# Patient Record
Sex: FEMALE | Race: WHITE | NOT HISPANIC OR LATINO | Employment: FULL TIME | URBAN - METROPOLITAN AREA
[De-identification: names, ages, dates, MRNs, and addresses within clinical notes are randomized per-mention and may not be internally consistent; named-entity substitution may affect disease eponyms.]

---

## 2019-02-27 ENCOUNTER — OFFICE VISIT (OUTPATIENT)
Dept: PULMONOLOGY | Facility: CLINIC | Age: 29
End: 2019-02-27
Attending: GENETIC COUNSELOR, MS
Payer: COMMERCIAL

## 2019-02-27 DIAGNOSIS — Z31.430 ENCOUNTER OF FEMALE FOR TESTING FOR GENETIC DISEASE CARRIER STATUS FOR PROCREATIVE MANAGEMENT: Primary | ICD-10-CM

## 2019-02-27 DIAGNOSIS — Z82.79 FAMILY HISTORY OF CONGENITAL OR GENETIC CONDITION: ICD-10-CM

## 2019-02-27 PROCEDURE — 36415 COLL VENOUS BLD VENIPUNCTURE: CPT | Performed by: GENETIC COUNSELOR, MS

## 2019-02-27 PROCEDURE — 40000072 ZZH STATISTIC GENETIC COUNSELING, < 16 MIN: Mod: ZF | Performed by: GENETIC COUNSELOR, MS

## 2019-02-27 NOTE — LETTER
2/27/2019    RE: Shanti Manuel  [address] [facility] [address]     Genetic Counseling Consultation    It was a pleasure to meet with Shanti and her , Diego, at her appointment at the [facility] on Feb 27, 2019.  Shanti was referred by her 's urologist, Dr. To, to learn more about carrier testing for cystic fibrosis (CF) since her  has congenital bilateral absence of vas deferens, which is highly associate with mutations in the CF gene (CFTR).  They are interested in carrier testing as this may have an impact on future family planning decisions. Her  is having CF genetic testing today as well.    Personal and Family History:  We began our discussion by taking a family history. Shanti is a healthy 29 year old woman, who reports no known reproductive difficulties. She has no children from previous relationships. Shanti has a 32 year old sister who is healthy and has children of her own, and a 21 year old brother who is healthy.  1. Maternal heritage is remarkable for grandfather who passed away in his 40's from cancer, although it is thought to be due to wartime environmental exposures. Her grandmother passed away at 73 from emphysema.   2. Paternal heritage is remarkable for one female cousin with fertility challenges.   3. There is no additional family history of cystic fibrosis, recurrent respiratory problems, severe asthma or allergies, infertility, birth defects, recurrent miscarriage, developmental delay, other genetic disorders, or consanguinity.  Ancestry is [place] and .    Genetic Counseling for Cystic Fibrosis:  As we discussed, approximately 1 in 25 Caucasians are carriers of CF.  Every person has two copies of every gene, including the gene for cystic fibrosis.  This gene is called CFTR.  When a person has a mutation (gene change) in one of their two copies of the CFTR gene, this person is considered a  carrier of CF. A carrier does not usually know they are a carrier unless they have carrier testing or have a child with CF. Being a carrier should not affect a person s health.  When a person has a mutation in both copies of their CFTR gene (two mutations), they are usually diagnosed with CF.     Cystic fibrosis is inherited in an autosomal recessive pattern, meaning that a child must inherit a mutation in the CFTR gene from both their mother and father in order to have CF. At conception, only one copy of each parent s CFTR gene is passed on to the baby.  No one has control over which copy they pass on to their child since it is a random process.  For couples who are both carriers of CF, there is a 1 in 4 or 25% chance for each child that they have to be affected with CF.  There is also a 3 in 4 or 75% chance for each child to be unaffected (carrier of non-carrier).      There are a number of reproductive options for couples who are at-risk to have a child with CF.  Some couples in utilizing in vitro fertilization to conceive consider pre-implantation genetic testing as part of that process. Others may choose testing during pregnancy, or donor sperm, or adoption. All of these possibilities can be discussed further once testing results are back.      Genetic Testing for Cystic Fibrosis:  Based on a negative family history but  heritage, there is a 1 in 25-30 (3-4%) chance for Shanti to be a carrier of CF.      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 Discussed common mutation panel vs sequencing vs expanded carrier screening for other conditions as well. Because they are going to need to be purposeful about achieving a pregnancy and they are likely to make reproductive decisions based on this information, Shanti did choose full CFTR gene sequencing in addition to  screening for Fragile X and spinal muscular atrophy, which are other relatively common genetic conditions (incidence of 1/[id number] and 1/10,000, respectively). The  Screen testing was ordered through [employer], with risks and benefits discussed and consent form signed. In this process it was also discussed that variants of unknown significance are not disclosed in this testing as it is difficult to make decisions based on this information, so detection rate may vary to reflect this. Shanti expressed understanding.     Plan:   1. Blood drawn today for [employer]'s  Screening test. Insurance verification will occur as part of this process and patient can decide re: insurance vs OOP billing and payment.  2. Shanti will be contacted once results are received in about 3-4 weeks. Revised risks for carrier status and for potential children will be reviewed at this time.   3. Contact information of [phone] was shared for future questions or concerns.       Farheen Noel MS, [facility]  Certified Genetic Counselor  [facility]  [facility]  MD [address] Lagunas  [address] [place]  [address]    Jan To MD  [facility]    Copy to patient   [address] [facility] [address]

## 2019-03-01 LAB — MISCELLANEOUS TEST: NORMAL

## 2019-03-06 NOTE — PROGRESS NOTES
Genetic Counseling Consultation    It was a pleasure to meet with Shanti and her , Diego, at her appointment at the [facility] on Feb 27, 2019.  Shanti was referred by her 's urologist, Dr. To, to learn more about carrier testing for cystic fibrosis (CF) since her  has congenital bilateral absence of vas deferens, which is highly associate with mutations in the CF gene (CFTR).  They are interested in carrier testing as this may have an impact on future family planning decisions. Her  is having CF genetic testing today as well.    Personal and Family History:  We began our discussion by taking a family history. Shanti is a healthy 29 year old woman, who reports no known reproductive difficulties. She has no children from previous relationships. Shanti has a 32 year old sister who is healthy and has children of her own, and a 21 year old brother who is healthy.  1. Maternal heritage is remarkable for grandfather who passed away in his 40's from cancer, although it is thought to be due to wartime environmental exposures. Her grandmother passed away at 73 from emphysema.   2. Paternal heritage is remarkable for one female cousin with fertility challenges.   3. There is no additional family history of cystic fibrosis, recurrent respiratory problems, severe asthma or allergies, infertility, birth defects, recurrent miscarriage, developmental delay, other genetic disorders, or consanguinity.  Ancestry is Malay and .    Genetic Counseling for Cystic Fibrosis:  As we discussed, approximately 1 in 25 Caucasians are carriers of CF.  Every person has two copies of every gene, including the gene for cystic fibrosis.  This gene is called CFTR.  When a person has a mutation (gene change) in one of their two copies of the CFTR gene, this person is considered a carrier of CF. A carrier does not usually know they are a carrier unless they have  carrier testing or have a child with CF. Being a carrier should not affect a person s health.  When a person has a mutation in both copies of their CFTR gene (two mutations), they are usually diagnosed with CF.     Cystic fibrosis is inherited in an autosomal recessive pattern, meaning that a child must inherit a mutation in the CFTR gene from both their mother and father in order to have CF. At conception, only one copy of each parent s CFTR gene is passed on to the baby.  No one has control over which copy they pass on to their child since it is a random process.  For couples who are both carriers of CF, there is a 1 in 4 or 25% chance for each child that they have to be affected with CF.  There is also a 3 in 4 or 75% chance for each child to be unaffected (carrier of non-carrier).      There are a number of reproductive options for couples who are at-risk to have a child with CF.  Some couples in utilizing in vitro fertilization to conceive consider pre-implantation genetic testing as part of that process. Others may choose testing during pregnancy, or donor sperm, or adoption. All of these possibilities can be discussed further once testing results are back.      Genetic Testing for Cystic Fibrosis:  Based on a negative family history but  heritage, there is a 1 in 25-30 (3-4%) chance for Shanti to be a carrier of CF.      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 Discussed common mutation panel vs sequencing vs expanded carrier screening for other conditions as well. Because they are going to need to be purposeful about achieving a pregnancy and they are likely to make reproductive decisions based on this information, Shanti did choose full CFTR gene sequencing in addition to screening for Fragile X and spinal muscular atrophy, which are other relatively common  genetic conditions (incidence of 1/[id number] and 1/10,000, respectively). The  Screen testing was ordered through [email or web address], with risks and benefits discussed and consent form signed. In this process it was also discussed that variants of unknown significance are not disclosed in this testing as it is difficult to make decisions based on this information, so detection rate may vary to reflect this. Shanti expressed understanding.     Plan:   1. Blood drawn today for [employer]'s  Screening test. Insurance verification will occur as part of this process and patient can decide re: insurance vs OOP billing and payment.  2. Shanti will be contacted once results are received in about 3-4 weeks. Revised risks for carrier status and for potential children will be reviewed at this time.   3. Contact information of [phone] was shared for future questions or concerns.     Farheen Noel MS, [facility]  Certified Genetic Counselor  [facility]  [facility]  MD [address] Lagunas  [address] [place]  [address]    Jan To MD  [facility]    Copy to patient   [address] [facility] [address]

## 2019-03-18 ENCOUNTER — TELEPHONE (OUTPATIENT)
Dept: PULMONOLOGY | Facility: CLINIC | Age: 29
End: 2019-03-18

## 2019-03-18 NOTE — LETTER
March 21, 2019      TO: Shanti Manuel  [address] [facility] [address]         Dear Shanti,    It was a pleasure to speak with you recently regarding your [employer]  Screening results, which tested for carrier status of cystic fibrosis, fragile X, and spinal muscular atrophy. As you know, your results were negative, meaning that they did not identify any genetic changes that currently recognized as clinically significant.     A negative test results indicated that your chance of being a carrier of these conditions is greatly reduced. However, you may still have genetic changes not identified by this testing. We discussed your residual risk to be a carrier and they are as follows: cystic fibrosis - 1 in 2700; fragile X - 1 in [address]133; and spinal muscular atrophy - 1 in 567.     You were particularly interested in your risk to be a cystic fibrosis carrier, as your partner was found to have CFTR gene mutations. Based on your results together, we would expect all of your biological children together to be cystic fibrosis carriers, but they would be at very low risk to actually have cystic fibrosis or cystic fibrosis-related disorders.     Thank you for allowing us to participate in your care. If you have any additional questions or concerns please do not hesitate to contact me at [phone].    Sincerely,     Farheen Noel MS, [facility]  Certified Genetic Counselor  [facility]

## 2019-03-18 NOTE — TELEPHONE ENCOUNTER
Called Shanti to discuss her  screening results for CF, Fragile X, and SMA, all of which were negative. This indicates that this test did not identify any genetic changes in these genes that are currently recognized as clinically significant. This test result significantly reduces that chances that Shanti is a carrier of the above listed conditions, but does not rule it out entirely. Reviewed this information with Shanti, and she declined further questions at this time.     Farheen Noel MS, [facility]  Certified Genetic Counselor  [facility]  [facility]

## 2019-07-18 ENCOUNTER — RECORDS - HEALTHEAST (OUTPATIENT)
Dept: ADMINISTRATIVE | Facility: OTHER | Age: 29
End: 2019-07-18

## 2019-07-18 LAB
LAB AP CHARGES (HE HISTORICAL CONVERSION): NORMAL
PATH REPORT.COMMENTS IMP SPEC: NORMAL
PATH REPORT.FINAL DX SPEC: NORMAL
PATH REPORT.GROSS SPEC: NORMAL
PATH REPORT.MICROSCOPIC SPEC OTHER STN: NORMAL
PATH REPORT.RELEVANT HX SPEC: NORMAL
RESULT FLAG (HE HISTORICAL CONVERSION): NORMAL

## 2019-11-11 ENCOUNTER — AMBULATORY - HEALTHEAST (OUTPATIENT)
Dept: MATERNAL FETAL MEDICINE | Facility: HOSPITAL | Age: 29
End: 2019-11-11

## 2019-11-11 ENCOUNTER — OFFICE VISIT - HEALTHEAST (OUTPATIENT)
Dept: MATERNAL FETAL MEDICINE | Facility: HOSPITAL | Age: 29
End: 2019-11-11

## 2019-11-11 ENCOUNTER — RECORDS - HEALTHEAST (OUTPATIENT)
Dept: ULTRASOUND IMAGING | Facility: HOSPITAL | Age: 29
End: 2019-11-11

## 2019-11-11 ENCOUNTER — RECORDS - HEALTHEAST (OUTPATIENT)
Dept: ADMINISTRATIVE | Facility: OTHER | Age: 29
End: 2019-11-11

## 2019-11-11 DIAGNOSIS — O26.90 PREGNANCY RELATED CONDITIONS, UNSPECIFIED, UNSPECIFIED TRIMESTER: ICD-10-CM

## 2019-11-11 DIAGNOSIS — O28.0 ABNORMAL QUAD SCREEN: ICD-10-CM

## 2019-11-11 DIAGNOSIS — O26.90 PREGNANCY, ANTEPARTUM, COMPLICATIONS: ICD-10-CM

## 2019-11-11 DIAGNOSIS — O09.812 PREGNANCY RESULTING FROM IN VITRO FERTILIZATION IN SECOND TRIMESTER: ICD-10-CM

## 2019-12-02 ENCOUNTER — RECORDS - HEALTHEAST (OUTPATIENT)
Dept: ADMINISTRATIVE | Facility: OTHER | Age: 29
End: 2019-12-02

## 2019-12-02 ENCOUNTER — RECORDS - HEALTHEAST (OUTPATIENT)
Dept: ULTRASOUND IMAGING | Facility: HOSPITAL | Age: 29
End: 2019-12-02

## 2019-12-02 ENCOUNTER — OFFICE VISIT - HEALTHEAST (OUTPATIENT)
Dept: MATERNAL FETAL MEDICINE | Facility: HOSPITAL | Age: 29
End: 2019-12-02

## 2019-12-02 DIAGNOSIS — O09.812 SUPERVISION OF PREGNANCY RESULTING FROM ASSISTED REPRODUCTIVE TECHNOLOGY, SECOND TRIMESTER: ICD-10-CM

## 2019-12-02 DIAGNOSIS — O09.812 PREGNANCY RESULTING FROM IN VITRO FERTILIZATION IN SECOND TRIMESTER: ICD-10-CM

## 2019-12-16 ENCOUNTER — RECORDS - HEALTHEAST (OUTPATIENT)
Dept: ULTRASOUND IMAGING | Facility: HOSPITAL | Age: 29
End: 2019-12-16

## 2019-12-16 ENCOUNTER — RECORDS - HEALTHEAST (OUTPATIENT)
Dept: ADMINISTRATIVE | Facility: OTHER | Age: 29
End: 2019-12-16

## 2019-12-16 ENCOUNTER — OFFICE VISIT - HEALTHEAST (OUTPATIENT)
Dept: MATERNAL FETAL MEDICINE | Facility: HOSPITAL | Age: 29
End: 2019-12-16

## 2019-12-16 DIAGNOSIS — O09.812 SUPERVISION OF PREGNANCY RESULTING FROM ASSISTED REPRODUCTIVE TECHNOLOGY, SECOND TRIMESTER: ICD-10-CM

## 2019-12-16 DIAGNOSIS — O09.812 PREGNANCY RESULTING FROM IN VITRO FERTILIZATION IN SECOND TRIMESTER: ICD-10-CM

## 2020-04-02 LAB — LAB SCANNED RESULT: NORMAL

## 2020-04-08 ASSESSMENT — MIFFLIN-ST. JEOR: SCORE: 1698.38

## 2020-04-09 ENCOUNTER — SURGERY - HEALTHEAST (OUTPATIENT)
Dept: OBGYN | Facility: CLINIC | Age: 30
End: 2020-04-09

## 2020-04-09 ENCOUNTER — ANESTHESIA - HEALTHEAST (OUTPATIENT)
Dept: OBGYN | Facility: CLINIC | Age: 30
End: 2020-04-09

## 2020-04-09 ASSESSMENT — MIFFLIN-ST. JEOR: SCORE: 1709.72

## 2020-04-12 ENCOUNTER — HOME CARE/HOSPICE - HEALTHEAST (OUTPATIENT)
Dept: HOME HEALTH SERVICES | Facility: HOME HEALTH | Age: 30
End: 2020-04-12

## 2021-04-25 ENCOUNTER — HEALTH MAINTENANCE LETTER (OUTPATIENT)
Age: 31
End: 2021-04-25

## 2021-06-03 NOTE — PROGRESS NOTES
[facility]  Genetic Counseling Consult    Patient: Shanti Manuel YOB: 1990   Date of Service:  2019      Shanti Manuel was seen at the [facility] for genetic consultation given abnormal quad screen results.      Impression/Plan:   Shanti had a 2/3 complete ultrasound today. Please see ultrasound report for additional information.    Pregnancy History:   /Parity:                            Age at Delivery: 30 y.o.  YEE: 2020, by Other Basis                  Gestational Age: 17w0d  -  No significant complications or exposures were reported in the current pregnancy.  -   This pregnancy was conceived by IVF using self donors. The retrieval date was 19. A single embryo was transferred on 8/3/2019. A li gestation was confirmed by early ultrasound.   - Shanti reported that she had first trimester bleeding between 6 and 13 weeks of pregnancy.    Family History:   A three-generation pedigree was obtained in cystic fibrosis clinic in February in 2019. Shanti's partner has congenital absence of the vas deferens. The patient and her partner had cystic fibrosis carrier screening through the [facility] clinic. She reported that her partner has two CFTR mutations associated with CBAVD and that her CF carrier testing was negative.  A negative CF carrier screening results reduces the risk for Shanti and her partner to have a child with cystic fibrosis.    Otherwise, the reported family history is negative for multiple miscarriages, stillbirths, birth defects, mental retardation, and consanguinity.       Risk Assessment:   We explained that the risk for fetal chromosome abnormalities increases with maternal age. We discussed specific features of common chromosome abnormalities, including Down syndrome, trisomy 13, trisomy 18, and sex chromosome trisomies.      At age  "30 at delivery, the midtrimester risk to have a baby with Down syndrome is 1 in 760.     At age 30 at delivery, the midtrimester risk to have a baby with any chromosome abnormality is 1 in 380.     The patient had a Sequential Screen earlier in pregnancy.     The results were \"Borderline elevation\" for ONTD/VWD    - Chemical values were as follows:    First trimester Analytes:  o ROSEMARY-A:1.16 MoM, free BhC.52, AFP:1.84  o NT:1.3 mm    Second trimester Analytes  o AFP 2.27 MoM, hCG 2.20 MoM, estriol 1.23 MoM, and TIANA 6.48 MoM    This screen gave the following risk assessments:  o Down syndrome risk= 1:1,828  o Trisomy 18 risk= 1:>250,000  o Open neural tube defects  And ventral wall defect combined risk = 1: 144      Testing Options:   We discussed the following options:   Comprehensive (Level II) ultrasound: Detailed ultrasound performed between 18-22 weeks gestation to screen for major birth defects and markers for aneuploidy.      We reviewed the benefits and limitations of this testing.  Screening tests provide a risk assessment specific to the pregnancy for certain fetal chromosome abnormalities, but cannot definitively diagnose or exclude a fetal chromosome abnormality.  Follow-up genetic counseling and consideration of diagnostic testing is recommended with any abnormal screening result. Diagnostic tests carry inherent risks- including risk of miscarriage- that require careful consideration.  These tests can detect fetal chromosome abnormalities with greater than 99% certainty.  Results can be compromised by maternal cell contamination or mosaicism, and are limited by the resolution of cytogenetic G-banding technology.  There is no screening nor diagnostic test that can detect all forms of birth defects or mental disability.    It was a pleasure to be involved with Shanti millard eliu. Face-to-face time of the meeting was 25 minutes.  Sangeetha Campoverde MS, [facility]  Maternal Fetal Medicine  [facility] " [facility]  Phone:[phone]  Phone: [phone]  Email: [email or web address]

## 2021-06-03 NOTE — PROGRESS NOTES
"Please see the \"Imaging\" tab for details of today's ultrasound.    Julee Briceno MD  Specialist in Maternal-Fetal Medicine      "

## 2021-06-04 VITALS — HEIGHT: 66 IN | WEIGHT: 214.5 LBS | BODY MASS INDEX: 34.47 KG/M2

## 2021-06-04 NOTE — PROGRESS NOTES
"Please see \"Imaging\" tab under Chart Review for full report.  This ultrasound was performed in the [facility], and may be located under Care Everywhere.    Sepideh Chilel MD  Maternal Fetal Medicine    "

## 2021-06-07 NOTE — ANESTHESIA PROCEDURE NOTES
Peripheral Block    Patient location during procedure: OR  Start time: 4/9/2020 12:26 PM  End time: 4/9/2020 12:32 PM  post-op analgesia per surgeon order as noted in medical record  Staffing:  Performing  Anesthesiologist: Presley Herndon MD  CRNA: Yarely Kaplan CRNA  Preanesthetic Checklist  Completed: patient identified, site marked, risks, benefits, and alternatives discussed, timeout performed, consent obtained, airway assessed, oxygen available, suction available, emergency drugs available and hand hygiene performed  Peripheral Block  Block type: other, TAP  Prep: ChloraPrep  Patient position: supine  Patient monitoring: cardiac monitor, heart rate, blood pressure and continuous pulse oximetry  Laterality: bilateral, same technique used bilaterally  Injection technique: ultrasound guided    Ultrasound used to visualize needle placement in proximity to nerve being blocked: yes   US used to visualize anesthetic spread  Visualized anatomic structures normal  No Pathological Findings  Permanent ultrasound image captured for medical record  Sterile gel and probe cover used for ultrasound.  Needle  Needle type: echogenic   Needle gauge: 22 G  Needle length: 6 in    Assessment  Injection assessment: no difficulty with injection, negative aspiration for heme, no paresthesia on injection and incremental injection

## 2021-06-07 NOTE — ANESTHESIA CARE TRANSFER NOTE
Last vitals:   Vitals:    04/09/20 1251   BP: 130/76   Pulse: (!) 101   Resp: 16   Temp: 36.5  C (97.7  F)   SpO2: 97%     Patient's level of consciousness is awake  Spontaneous respirations: yes  Maintains airway independently: yes  Dentition unchanged: yes  Oropharynx: oropharynx clear of all foreign objects    QCDR Measures:  ASA# 20 - Surgical Safety Checklist: WHO surgical safety checklist completed prior to induction    PQRS# 430 - Adult PONV Prevention: 4558F - Pt received => 2 anti-emetic agents (different classes) preop & intraop  ASA# 8 - Peds PONV Prevention: NA - Not pediatric patient, not GA or 2 or more risk factors NOT present  PQRS# 424 - Carly-op Temp Management: 4559F - At least one body temp DOCUMENTED => 35.5C or 95.9F within required timeframe  PQRS# 426 - PACU Transfer Protocol: - Transfer of care checklist used  ASA# 14 - Acute Post-op Pain: ASA14B - Patient did NOT experience pain >= 7 out of 10

## 2021-06-07 NOTE — ANESTHESIA PREPROCEDURE EVALUATION
Anesthesia Evaluation      Patient summary reviewed   No history of anesthetic complications     Airway    Pulmonary - negative ROS                          Cardiovascular - negative ROS  (+) hypertension (preE), ,      Neuro/Psych - negative ROS     Endo/Other    (+) obesity, pregnant     GI/Hepatic/Renal    (+) GERD,             Dental                         Anesthesia Plan  Planned anesthetic: epidural  Bilateral TAP blocks requested by surgeon for POP  Duramorph for C/S    ASA 3 - emergent   Induction: intravenous   Anesthetic plan and risks discussed with: patient    Post-op plan: epidural analgesia and routine recovery        1132 -- Urgent C/S called 2/2 fetal distress  EDC functioning well, will plan to use the EDC  ASA PS changed to 3E

## 2021-06-07 NOTE — ANESTHESIA POSTPROCEDURE EVALUATION
Patient: Shanti Manuel  Procedure(s):   SECTION  Anesthesia type: epidural    Patient location: Labor and Delivery  Last vitals:   Vitals Value Taken Time   /84 2020  1:30 PM   Temp 36.5  C (97.7  F) 2020 12:51 PM   Pulse 101 2020 12:51 PM   Resp 18 2020  1:00 PM   SpO2 94 % 2020  1:00 PM     Post vital signs: stable  Level of consciousness: awake and responds to simple questions  Post-anesthesia pain: pain controlled  Post-anesthesia nausea and vomiting: no  Pulmonary: unassisted, return to baseline  Cardiovascular: stable and blood pressure at baseline  Hydration: adequate  Anesthetic events: no    QCDR Measures:  ASA# 11 - Carly-op Cardiac Arrest: ASA11B - Patient did NOT experience unanticipated cardiac arrest  ASA# 12 - Carly-op Mortality Rate: ASA12B - Patient did NOT die  ASA# 13 - PACU Re-Intubation Rate: NA - No ETT / LMA used for case  ASA# 10 - Composite Anes Safety: ASA10A - No serious adverse event    Additional Notes:

## 2021-06-07 NOTE — ANESTHESIA PROCEDURE NOTES
Epidural Block    Patient location during procedure: OB  Time Called: 4/9/2020 10:16 AM  Reason for Block:labor epidural  Staffing:  Performing  Anesthesiologist: Presley Herndon MD  Preanesthetic Checklist  Completed: patient identified, risks, benefits, and alternatives discussed, timeout performed, consent obtained, at patient's request, airway assessed, oxygen available, suction available, emergency drugs available and hand hygiene performed  Procedure  Patient position: sitting  Prep: ChloraPrep  Patient monitoring: continuous pulse oximetry, heart rate and blood pressure  Approach: midline  Location: L3-L4  Injection technique: JERRI air  Number of Attempts:1  Needle  Needle type: Alfredo   Needle gauge: 18 G     Catheter in Space: 4  Assessment  Sensory level:  No complications

## 2021-06-16 PROBLEM — Z3A.38 38 WEEKS GESTATION OF PREGNANCY: Status: ACTIVE | Noted: 2020-04-09

## 2021-06-16 PROBLEM — O14.13 SEVERE PRE-ECLAMPSIA IN THIRD TRIMESTER: Status: ACTIVE | Noted: 2020-04-08

## 2021-07-14 PROBLEM — Z34.90 PREGNANT: Status: RESOLVED | Noted: 2020-04-08 | Resolved: 2020-04-09

## 2021-07-14 PROBLEM — O16.3 HYPERTENSION AFFECTING PREGNANCY IN THIRD TRIMESTER: Status: RESOLVED | Noted: 2020-04-08 | Resolved: 2020-04-09

## 2021-10-10 ENCOUNTER — HEALTH MAINTENANCE LETTER (OUTPATIENT)
Age: 31
End: 2021-10-10

## 2022-04-26 LAB
ABO (EXTERNAL): NORMAL
HEMOGLOBIN (EXTERNAL): 13.7 G/DL (ref 11.1–15.9)
HEPATITIS B SURFACE ANTIGEN (EXTERNAL): NEGATIVE
HIV1+2 AB SERPL QL IA: NONREACTIVE
PLATELET COUNT (EXTERNAL): 398 10E3/UL (ref 150–450)
RH (EXTERNAL): POSITIVE
RUBELLA ANTIBODY IGG (EXTERNAL): NORMAL

## 2022-05-05 ENCOUNTER — E-VISIT (OUTPATIENT)
Dept: URGENT CARE | Facility: CLINIC | Age: 32
End: 2022-05-05
Payer: COMMERCIAL

## 2022-05-05 ENCOUNTER — OFFICE VISIT (OUTPATIENT)
Dept: FAMILY MEDICINE | Facility: CLINIC | Age: 32
End: 2022-05-05
Payer: COMMERCIAL

## 2022-05-05 VITALS
BODY MASS INDEX: 28.41 KG/M2 | DIASTOLIC BLOOD PRESSURE: 90 MMHG | HEART RATE: 87 BPM | TEMPERATURE: 98.6 F | SYSTOLIC BLOOD PRESSURE: 132 MMHG | RESPIRATION RATE: 16 BRPM | OXYGEN SATURATION: 99 % | WEIGHT: 176 LBS

## 2022-05-05 DIAGNOSIS — R05.9 COUGH: Primary | ICD-10-CM

## 2022-05-05 DIAGNOSIS — J06.9 VIRAL URI: Primary | ICD-10-CM

## 2022-05-05 PROCEDURE — 99207 PR NON-BILLABLE SERV PER CHARTING: CPT | Performed by: FAMILY MEDICINE

## 2022-05-05 PROCEDURE — 99203 OFFICE O/P NEW LOW 30 MIN: CPT | Performed by: FAMILY MEDICINE

## 2022-05-05 RX ORDER — PROGESTERONE 50 MG/ML
INJECTION, SOLUTION INTRAMUSCULAR
Status: ON HOLD | COMMUNITY
Start: 2022-04-18 | End: 2022-11-19

## 2022-05-05 NOTE — PATIENT INSTRUCTIONS
Dear Shanti Manuel,    We are sorry you are not feeling well. Based on the responses you provided, it is recommended that you be seen in-person in urgent care so we can better evaluate your symptoms. Please click here to find the nearest urgent care location to you.   You will not be charged for this Visit. Thank you for trusting us with your care.    Komal Aldana MD

## 2022-05-05 NOTE — PROGRESS NOTES
Assessment:       Viral URI    Plan:     Symptoms consistent with a viral upper respiratory infection.  No antibiotics indicated at this time.  Recommend symptomatic care with Mucinex, fluids, rest.  Discussed the typical course of symptoms.  Follow-up if symptoms getting worse or not improving.    MEDICATIONS:   Orders Placed This Encounter   Medications     progesterone, in sesame oil, 50 MG/ML injection     Sig: ADMINISTER 1 ML IN THE MUSCLE EVERY OTHER DAY AS DIRECTED       Subjective:       32 year old female  2 para 1-0-0-1 at a time gestation presents for evaluation of a 7-day history of congestion and productive cough.  She is otherwise been feeling okay without fevers or chills.  No shortness of breath or wheezing.  She denies sore throat or ear pain.  She has not take anything for symptoms.    Patient Active Problem List   Diagnosis     Severe pre-eclampsia in third trimester      delivery delivered     Fetal heart rate decelerations, delivered     38 weeks gestation of pregnancy       No past medical history on file.    Past Surgical History:   Procedure Laterality Date      SECTION N/A 2020    Procedure:  SECTION;  Surgeon: Carol Trejo MD;  Location: [facility] OR;  Service: Obstetrics     POLYPECTOMY  2019    removed from uterus       Current Outpatient Medications   Medication     prenatal no115-iron-folic acid 29 mg iron- 1 mg Chew     progesterone, in sesame oil, 50 MG/ML injection     acetaminophen (TYLENOL) 325 MG tablet     ascorbic acid, vitamin C, (VITAMIN C) 1000 MG tablet     cholecalciferol, vitamin D3, 1,000 unit (25 mcg) tablet     ibuprofen (ADVIL,MOTRIN) 800 MG tablet     oxyCODONE (ROXICODONE) 5 MG immediate release tablet     No current facility-administered medications for this visit.       No Known Allergies    No family history on file.    Social History     Socioeconomic History     Marital status:      Spouse name: None      Number of children: None     Years of education: None     Highest education level: None   Tobacco Use     Smoking status: Never Smoker     Smokeless tobacco: Never Used   Substance and Sexual Activity     Alcohol use: Never     Drug use: Never     Sexual activity: Yes     Partners: Male         Review of Systems  Pertinent items are noted in HPI.      Objective:                     General Appearance:    BP (!) 132/90   Pulse 87   Temp 98.6  F (37  C)   Resp 16   Wt 79.8 kg (176 lb)   LMP 11/10/2021 (Approximate)   SpO2 99%   Breastfeeding No   BMI 28.41 kg/m          Alert, pleasant, cooperative, no distress, appears stated age   Head:    Normocephalic, without obvious abnormality, atraumatic   Eyes:    Conjunctiva/corneas clear   Ears:    Normal TM's without erythema or bulging. Normal external ear canals, both ears   Nose:   Nares normal, septum midline, mucosa normal, no drainage    or sinus tenderness   Throat:   Lips, mucosa, and tongue normal; teeth and gums normal.  No tonsilar hypertrophy or exudate.   Neck:   Supple, symmetrical, trachea midline, no adenopathy    Lungs:     Clear to auscultation bilaterally without wheezes, rales, or rhonchi, respirations unlabored    Heart:    Regular rate and rhythm, S1 and S2 normal, no murmur, rub or gallop       Extremities:   Extremities normal, atraumatic, no cyanosis or edema   Skin:   Skin color, texture, turgor normal, no rashes or lesions         This note has been dictated using voice recognition software. Any grammatical or context distortions are unintentional and inherent to the software

## 2022-05-21 ENCOUNTER — HEALTH MAINTENANCE LETTER (OUTPATIENT)
Age: 32
End: 2022-05-21

## 2022-09-15 LAB — TREPONEMA PALLIDUM ANTIBODY (EXTERNAL): NONREACTIVE

## 2022-09-18 ENCOUNTER — HEALTH MAINTENANCE LETTER (OUTPATIENT)
Age: 32
End: 2022-09-18

## 2022-11-02 LAB — GROUP B STREPTOCOCCUS (EXTERNAL): NEGATIVE

## 2022-11-17 ENCOUNTER — ANESTHESIA EVENT (OUTPATIENT)
Dept: OBGYN | Facility: CLINIC | Age: 32
End: 2022-11-17
Payer: COMMERCIAL

## 2022-11-17 ENCOUNTER — HOSPITAL ENCOUNTER (INPATIENT)
Facility: CLINIC | Age: 32
LOS: 2 days | Discharge: HOME | End: 2022-11-19
Attending: OBSTETRICS & GYNECOLOGY | Admitting: OBSTETRICS & GYNECOLOGY
Payer: COMMERCIAL

## 2022-11-17 ENCOUNTER — ANCILLARY PROCEDURE (OUTPATIENT)
Dept: ULTRASOUND IMAGING | Facility: CLINIC | Age: 32
End: 2022-11-17
Attending: ANESTHESIOLOGY
Payer: COMMERCIAL

## 2022-11-17 ENCOUNTER — ANESTHESIA (OUTPATIENT)
Dept: OBGYN | Facility: CLINIC | Age: 32
End: 2022-11-17
Payer: COMMERCIAL

## 2022-11-17 LAB
ABO/RH(D): NORMAL
ANTIBODY SCREEN: NEGATIVE
BASOPHILS # BLD AUTO: 0 10E3/UL (ref 0–0.2)
BASOPHILS NFR BLD AUTO: 0 %
EOSINOPHIL # BLD AUTO: 0.1 10E3/UL (ref 0–0.7)
EOSINOPHIL NFR BLD AUTO: 1 %
ERYTHROCYTE [DISTWIDTH] IN BLOOD BY AUTOMATED COUNT: 13 % (ref 10–15)
HCT VFR BLD AUTO: 34 % (ref 35–47)
HGB BLD-MCNC: 10.9 G/DL (ref 11.7–15.7)
IMM GRANULOCYTES # BLD: 0.2 10E3/UL
IMM GRANULOCYTES NFR BLD: 2 %
LYMPHOCYTES # BLD AUTO: 0.3 10E3/UL (ref 0.8–5.3)
LYMPHOCYTES NFR BLD AUTO: 3 %
MCH RBC QN AUTO: 26.3 PG (ref 26.5–33)
MCHC RBC AUTO-ENTMCNC: 32.1 G/DL (ref 31.5–36.5)
MCV RBC AUTO: 82 FL (ref 78–100)
MONOCYTES # BLD AUTO: 1.2 10E3/UL (ref 0–1.3)
MONOCYTES NFR BLD AUTO: 11 %
NEUTROPHILS # BLD AUTO: 9.2 10E3/UL (ref 1.6–8.3)
NEUTROPHILS NFR BLD AUTO: 83 %
NRBC # BLD AUTO: 0 10E3/UL
NRBC BLD AUTO-RTO: 0 /100
PLATELET # BLD AUTO: 222 10E3/UL (ref 150–450)
RBC # BLD AUTO: 4.14 10E6/UL (ref 3.8–5.2)
SARS-COV-2 RNA RESP QL NAA+PROBE: POSITIVE
SPECIMEN EXPIRATION DATE: NORMAL
WBC # BLD AUTO: 11 10E3/UL (ref 4–11)

## 2022-11-17 PROCEDURE — 258N000003 HC RX IP 258 OP 636: Performed by: OBSTETRICS & GYNECOLOGY

## 2022-11-17 PROCEDURE — C9290 INJ, BUPIVACAINE LIPOSOME: HCPCS

## 2022-11-17 PROCEDURE — 272N000001 HC OR GENERAL SUPPLY STERILE: Performed by: OBSTETRICS & GYNECOLOGY

## 2022-11-17 PROCEDURE — 999N000249 HC STATISTIC C-SECTION ON UNIT

## 2022-11-17 PROCEDURE — 36415 COLL VENOUS BLD VENIPUNCTURE: CPT | Performed by: OBSTETRICS & GYNECOLOGY

## 2022-11-17 PROCEDURE — 10907ZC DRAINAGE OF AMNIOTIC FLUID, THERAPEUTIC FROM PRODUCTS OF CONCEPTION, VIA NATURAL OR ARTIFICIAL OPENING: ICD-10-PCS | Performed by: OBSTETRICS & GYNECOLOGY

## 2022-11-17 PROCEDURE — 360N000076 HC SURGERY LEVEL 3, PER MIN: Performed by: OBSTETRICS & GYNECOLOGY

## 2022-11-17 PROCEDURE — 250N000011 HC RX IP 250 OP 636: Performed by: ANESTHESIOLOGY

## 2022-11-17 PROCEDURE — 250N000013 HC RX MED GY IP 250 OP 250 PS 637: Performed by: OBSTETRICS & GYNECOLOGY

## 2022-11-17 PROCEDURE — 370N000017 HC ANESTHESIA TECHNICAL FEE, PER MIN: Performed by: OBSTETRICS & GYNECOLOGY

## 2022-11-17 PROCEDURE — 120N000001 HC R&B MED SURG/OB

## 2022-11-17 PROCEDURE — C9290 INJ, BUPIVACAINE LIPOSOME: HCPCS | Performed by: ANESTHESIOLOGY

## 2022-11-17 PROCEDURE — 86901 BLOOD TYPING SEROLOGIC RH(D): CPT | Performed by: OBSTETRICS & GYNECOLOGY

## 2022-11-17 PROCEDURE — 250N000011 HC RX IP 250 OP 636

## 2022-11-17 PROCEDURE — 86850 RBC ANTIBODY SCREEN: CPT | Performed by: OBSTETRICS & GYNECOLOGY

## 2022-11-17 PROCEDURE — 85025 COMPLETE CBC W/AUTO DIFF WBC: CPT | Performed by: OBSTETRICS & GYNECOLOGY

## 2022-11-17 PROCEDURE — 258N000003 HC RX IP 258 OP 636: Performed by: NURSE ANESTHETIST, CERTIFIED REGISTERED

## 2022-11-17 PROCEDURE — 250N000009 HC RX 250: Performed by: NURSE ANESTHETIST, CERTIFIED REGISTERED

## 2022-11-17 PROCEDURE — 250N000011 HC RX IP 250 OP 636: Performed by: OBSTETRICS & GYNECOLOGY

## 2022-11-17 PROCEDURE — U0003 INFECTIOUS AGENT DETECTION BY NUCLEIC ACID (DNA OR RNA); SEVERE ACUTE RESPIRATORY SYNDROME CORONAVIRUS 2 (SARS-COV-2) (CORONAVIRUS DISEASE [COVID-19]), AMPLIFIED PROBE TECHNIQUE, MAKING USE OF HIGH THROUGHPUT TECHNOLOGIES AS DESCRIBED BY CMS-2020-01-R: HCPCS | Performed by: OBSTETRICS & GYNECOLOGY

## 2022-11-17 PROCEDURE — 86780 TREPONEMA PALLIDUM: CPT | Performed by: OBSTETRICS & GYNECOLOGY

## 2022-11-17 PROCEDURE — 999N000249 HC STATISTIC C-SECTION ON UNIT: Performed by: OBSTETRICS & GYNECOLOGY

## 2022-11-17 PROCEDURE — 88307 TISSUE EXAM BY PATHOLOGIST: CPT | Mod: TC | Performed by: OBSTETRICS & GYNECOLOGY

## 2022-11-17 PROCEDURE — 250N000011 HC RX IP 250 OP 636: Performed by: NURSE ANESTHETIST, CERTIFIED REGISTERED

## 2022-11-17 RX ORDER — OXYTOCIN/0.9 % SODIUM CHLORIDE 30/500 ML
100-340 PLASTIC BAG, INJECTION (ML) INTRAVENOUS CONTINUOUS PRN
Status: CANCELLED | OUTPATIENT
Start: 2022-11-17

## 2022-11-17 RX ORDER — OXYCODONE HYDROCHLORIDE 5 MG/1
5 TABLET ORAL EVERY 4 HOURS PRN
Status: DISCONTINUED | OUTPATIENT
Start: 2022-11-17 | End: 2022-11-19 | Stop reason: HOSPADM

## 2022-11-17 RX ORDER — GLYCOPYRROLATE 0.2 MG/ML
INJECTION, SOLUTION INTRAMUSCULAR; INTRAVENOUS PRN
Status: DISCONTINUED | OUTPATIENT
Start: 2022-11-17 | End: 2022-11-17

## 2022-11-17 RX ORDER — BUPIVACAINE HYDROCHLORIDE 2.5 MG/ML
INJECTION, SOLUTION EPIDURAL; INFILTRATION; INTRACAUDAL
Status: COMPLETED | OUTPATIENT
Start: 2022-11-17 | End: 2022-11-17

## 2022-11-17 RX ORDER — CEFAZOLIN SODIUM 2 G/100ML
2 INJECTION, SOLUTION INTRAVENOUS
Status: DISCONTINUED | OUTPATIENT
Start: 2022-11-17 | End: 2022-11-17 | Stop reason: HOSPADM

## 2022-11-17 RX ORDER — ACETAMINOPHEN 325 MG/1
975 TABLET ORAL ONCE
Status: COMPLETED | OUTPATIENT
Start: 2022-11-17 | End: 2022-11-17

## 2022-11-17 RX ORDER — OXYTOCIN/0.9 % SODIUM CHLORIDE 30/500 ML
340 PLASTIC BAG, INJECTION (ML) INTRAVENOUS CONTINUOUS PRN
Status: DISCONTINUED | OUTPATIENT
Start: 2022-11-17 | End: 2022-11-17 | Stop reason: HOSPADM

## 2022-11-17 RX ORDER — MISOPROSTOL 200 UG/1
400 TABLET ORAL
Status: DISCONTINUED | OUTPATIENT
Start: 2022-11-17 | End: 2022-11-19 | Stop reason: HOSPADM

## 2022-11-17 RX ORDER — EPHEDRINE SULFATE 50 MG/ML
INJECTION, SOLUTION INTRAMUSCULAR; INTRAVENOUS; SUBCUTANEOUS PRN
Status: DISCONTINUED | OUTPATIENT
Start: 2022-11-17 | End: 2022-11-17

## 2022-11-17 RX ORDER — ACETAMINOPHEN 325 MG/1
975 TABLET ORAL EVERY 6 HOURS
Status: DISCONTINUED | OUTPATIENT
Start: 2022-11-17 | End: 2022-11-19 | Stop reason: HOSPADM

## 2022-11-17 RX ORDER — AMOXICILLIN 250 MG
1 CAPSULE ORAL 2 TIMES DAILY
Status: DISCONTINUED | OUTPATIENT
Start: 2022-11-17 | End: 2022-11-19 | Stop reason: HOSPADM

## 2022-11-17 RX ORDER — KETOROLAC TROMETHAMINE 30 MG/ML
30 INJECTION, SOLUTION INTRAMUSCULAR; INTRAVENOUS EVERY 6 HOURS
Status: COMPLETED | OUTPATIENT
Start: 2022-11-17 | End: 2022-11-18

## 2022-11-17 RX ORDER — LIDOCAINE 40 MG/G
CREAM TOPICAL
Status: DISCONTINUED | OUTPATIENT
Start: 2022-11-17 | End: 2022-11-17 | Stop reason: HOSPADM

## 2022-11-17 RX ORDER — IBUPROFEN 800 MG/1
800 TABLET, FILM COATED ORAL EVERY 6 HOURS
Status: DISCONTINUED | OUTPATIENT
Start: 2022-11-18 | End: 2022-11-19 | Stop reason: HOSPADM

## 2022-11-17 RX ORDER — MORPHINE SULFATE 1 MG/ML
INJECTION, SOLUTION EPIDURAL; INTRATHECAL; INTRAVENOUS
Status: COMPLETED | OUTPATIENT
Start: 2022-11-17 | End: 2022-11-17

## 2022-11-17 RX ORDER — MODIFIED LANOLIN
OINTMENT (GRAM) TOPICAL
Status: DISCONTINUED | OUTPATIENT
Start: 2022-11-17 | End: 2022-11-19 | Stop reason: HOSPADM

## 2022-11-17 RX ORDER — DIPHENHYDRAMINE HYDROCHLORIDE 50 MG/ML
25 INJECTION INTRAMUSCULAR; INTRAVENOUS EVERY 6 HOURS PRN
Status: DISCONTINUED | OUTPATIENT
Start: 2022-11-17 | End: 2022-11-19 | Stop reason: HOSPADM

## 2022-11-17 RX ORDER — NALOXONE HYDROCHLORIDE 0.4 MG/ML
0.2 INJECTION, SOLUTION INTRAMUSCULAR; INTRAVENOUS; SUBCUTANEOUS
Status: DISCONTINUED | OUTPATIENT
Start: 2022-11-17 | End: 2022-11-19 | Stop reason: HOSPADM

## 2022-11-17 RX ORDER — CEFAZOLIN SODIUM 2 G/100ML
2 INJECTION, SOLUTION INTRAVENOUS SEE ADMIN INSTRUCTIONS
Status: DISCONTINUED | OUTPATIENT
Start: 2022-11-17 | End: 2022-11-17 | Stop reason: HOSPADM

## 2022-11-17 RX ORDER — PROCHLORPERAZINE MALEATE 10 MG
10 TABLET ORAL EVERY 6 HOURS PRN
Status: DISCONTINUED | OUTPATIENT
Start: 2022-11-17 | End: 2022-11-19 | Stop reason: HOSPADM

## 2022-11-17 RX ORDER — DEXTROSE, SODIUM CHLORIDE, SODIUM LACTATE, POTASSIUM CHLORIDE, AND CALCIUM CHLORIDE 5; .6; .31; .03; .02 G/100ML; G/100ML; G/100ML; G/100ML; G/100ML
INJECTION, SOLUTION INTRAVENOUS CONTINUOUS
Status: DISCONTINUED | OUTPATIENT
Start: 2022-11-17 | End: 2022-11-19 | Stop reason: HOSPADM

## 2022-11-17 RX ORDER — MORPHINE SULFATE 0.5 MG/ML
150 INJECTION, SOLUTION EPIDURAL; INTRATHECAL; INTRAVENOUS ONCE
Status: DISCONTINUED | OUTPATIENT
Start: 2022-11-17 | End: 2022-11-17 | Stop reason: HOSPADM

## 2022-11-17 RX ORDER — SODIUM CHLORIDE, SODIUM LACTATE, POTASSIUM CHLORIDE, CALCIUM CHLORIDE 600; 310; 30; 20 MG/100ML; MG/100ML; MG/100ML; MG/100ML
INJECTION, SOLUTION INTRAVENOUS CONTINUOUS
Status: DISCONTINUED | OUTPATIENT
Start: 2022-11-17 | End: 2022-11-17 | Stop reason: HOSPADM

## 2022-11-17 RX ORDER — BUPIVACAINE HYDROCHLORIDE 7.5 MG/ML
INJECTION, SOLUTION INTRASPINAL
Status: COMPLETED | OUTPATIENT
Start: 2022-11-17 | End: 2022-11-17

## 2022-11-17 RX ORDER — METHYLERGONOVINE MALEATE 0.2 MG/ML
200 INJECTION INTRAVENOUS
Status: DISCONTINUED | OUTPATIENT
Start: 2022-11-17 | End: 2022-11-17 | Stop reason: HOSPADM

## 2022-11-17 RX ORDER — MISOPROSTOL 200 UG/1
800 TABLET ORAL
Status: DISCONTINUED | OUTPATIENT
Start: 2022-11-17 | End: 2022-11-17

## 2022-11-17 RX ORDER — OXYTOCIN 10 [USP'U]/ML
10 INJECTION, SOLUTION INTRAMUSCULAR; INTRAVENOUS
Status: CANCELLED | OUTPATIENT
Start: 2022-11-17

## 2022-11-17 RX ORDER — SIMETHICONE 80 MG
80 TABLET,CHEWABLE ORAL 4 TIMES DAILY PRN
Status: DISCONTINUED | OUTPATIENT
Start: 2022-11-17 | End: 2022-11-19 | Stop reason: HOSPADM

## 2022-11-17 RX ORDER — OXYTOCIN/0.9 % SODIUM CHLORIDE 30/500 ML
PLASTIC BAG, INJECTION (ML) INTRAVENOUS CONTINUOUS PRN
Status: DISCONTINUED | OUTPATIENT
Start: 2022-11-17 | End: 2022-11-17

## 2022-11-17 RX ORDER — CARBOPROST TROMETHAMINE 250 UG/ML
250 INJECTION, SOLUTION INTRAMUSCULAR
Status: DISCONTINUED | OUTPATIENT
Start: 2022-11-17 | End: 2022-11-19 | Stop reason: HOSPADM

## 2022-11-17 RX ORDER — BISACODYL 10 MG
10 SUPPOSITORY, RECTAL RECTAL DAILY PRN
Status: DISCONTINUED | OUTPATIENT
Start: 2022-11-19 | End: 2022-11-19 | Stop reason: HOSPADM

## 2022-11-17 RX ORDER — ONDANSETRON 4 MG/1
4 TABLET, ORALLY DISINTEGRATING ORAL EVERY 6 HOURS PRN
Status: DISCONTINUED | OUTPATIENT
Start: 2022-11-17 | End: 2022-11-19 | Stop reason: HOSPADM

## 2022-11-17 RX ORDER — OXYTOCIN 10 [USP'U]/ML
10 INJECTION, SOLUTION INTRAMUSCULAR; INTRAVENOUS
Status: DISCONTINUED | OUTPATIENT
Start: 2022-11-17 | End: 2022-11-19 | Stop reason: HOSPADM

## 2022-11-17 RX ORDER — HYDROCORTISONE 25 MG/G
CREAM TOPICAL 3 TIMES DAILY PRN
Status: DISCONTINUED | OUTPATIENT
Start: 2022-11-17 | End: 2022-11-19 | Stop reason: HOSPADM

## 2022-11-17 RX ORDER — AZITHROMYCIN 500 MG/5ML
500 INJECTION, POWDER, LYOPHILIZED, FOR SOLUTION INTRAVENOUS
Status: COMPLETED | OUTPATIENT
Start: 2022-11-17 | End: 2022-11-17

## 2022-11-17 RX ORDER — CITRIC ACID/SODIUM CITRATE 334-500MG
30 SOLUTION, ORAL ORAL
Status: COMPLETED | OUTPATIENT
Start: 2022-11-17 | End: 2022-11-17

## 2022-11-17 RX ORDER — METOCLOPRAMIDE HYDROCHLORIDE 5 MG/ML
10 INJECTION INTRAMUSCULAR; INTRAVENOUS EVERY 6 HOURS PRN
Status: DISCONTINUED | OUTPATIENT
Start: 2022-11-17 | End: 2022-11-19 | Stop reason: HOSPADM

## 2022-11-17 RX ORDER — NALOXONE HYDROCHLORIDE 0.4 MG/ML
0.4 INJECTION, SOLUTION INTRAMUSCULAR; INTRAVENOUS; SUBCUTANEOUS
Status: DISCONTINUED | OUTPATIENT
Start: 2022-11-17 | End: 2022-11-19 | Stop reason: HOSPADM

## 2022-11-17 RX ORDER — METOCLOPRAMIDE 10 MG/1
10 TABLET ORAL EVERY 6 HOURS PRN
Status: DISCONTINUED | OUTPATIENT
Start: 2022-11-17 | End: 2022-11-19 | Stop reason: HOSPADM

## 2022-11-17 RX ORDER — MISOPROSTOL 200 UG/1
400 TABLET ORAL
Status: DISCONTINUED | OUTPATIENT
Start: 2022-11-17 | End: 2022-11-17

## 2022-11-17 RX ORDER — OXYTOCIN 10 [USP'U]/ML
10 INJECTION, SOLUTION INTRAMUSCULAR; INTRAVENOUS
Status: DISCONTINUED | OUTPATIENT
Start: 2022-11-17 | End: 2022-11-17 | Stop reason: HOSPADM

## 2022-11-17 RX ORDER — OXYTOCIN/0.9 % SODIUM CHLORIDE 30/500 ML
340 PLASTIC BAG, INJECTION (ML) INTRAVENOUS CONTINUOUS PRN
Status: DISCONTINUED | OUTPATIENT
Start: 2022-11-17 | End: 2022-11-19 | Stop reason: HOSPADM

## 2022-11-17 RX ORDER — LIDOCAINE 40 MG/G
CREAM TOPICAL
Status: DISCONTINUED | OUTPATIENT
Start: 2022-11-17 | End: 2022-11-19 | Stop reason: HOSPADM

## 2022-11-17 RX ORDER — CITRIC ACID/SODIUM CITRATE 334-500MG
15 SOLUTION, ORAL ORAL
Status: DISCONTINUED | OUTPATIENT
Start: 2022-11-17 | End: 2022-11-17 | Stop reason: HOSPADM

## 2022-11-17 RX ORDER — METHYLERGONOVINE MALEATE 0.2 MG/ML
200 INJECTION INTRAVENOUS
Status: DISCONTINUED | OUTPATIENT
Start: 2022-11-17 | End: 2022-11-19 | Stop reason: HOSPADM

## 2022-11-17 RX ORDER — MISOPROSTOL 200 UG/1
800 TABLET ORAL
Status: DISCONTINUED | OUTPATIENT
Start: 2022-11-17 | End: 2022-11-19 | Stop reason: HOSPADM

## 2022-11-17 RX ORDER — ONDANSETRON 2 MG/ML
4 INJECTION INTRAMUSCULAR; INTRAVENOUS EVERY 6 HOURS PRN
Status: DISCONTINUED | OUTPATIENT
Start: 2022-11-17 | End: 2022-11-19 | Stop reason: HOSPADM

## 2022-11-17 RX ORDER — AMOXICILLIN 250 MG
2 CAPSULE ORAL 2 TIMES DAILY
Status: DISCONTINUED | OUTPATIENT
Start: 2022-11-17 | End: 2022-11-19 | Stop reason: HOSPADM

## 2022-11-17 RX ORDER — DIPHENHYDRAMINE HCL 25 MG
25 CAPSULE ORAL EVERY 6 HOURS PRN
Status: DISCONTINUED | OUTPATIENT
Start: 2022-11-17 | End: 2022-11-19 | Stop reason: HOSPADM

## 2022-11-17 RX ORDER — PROCHLORPERAZINE 25 MG
25 SUPPOSITORY, RECTAL RECTAL EVERY 12 HOURS PRN
Status: DISCONTINUED | OUTPATIENT
Start: 2022-11-17 | End: 2022-11-19 | Stop reason: HOSPADM

## 2022-11-17 RX ORDER — CARBOPROST TROMETHAMINE 250 UG/ML
250 INJECTION, SOLUTION INTRAMUSCULAR
Status: DISCONTINUED | OUTPATIENT
Start: 2022-11-17 | End: 2022-11-17 | Stop reason: HOSPADM

## 2022-11-17 RX ADMIN — Medication 5 MG: at 14:18

## 2022-11-17 RX ADMIN — Medication 10 MG: at 14:08

## 2022-11-17 RX ADMIN — KETOROLAC TROMETHAMINE 30 MG: 30 INJECTION, SOLUTION INTRAMUSCULAR; INTRAVENOUS at 20:20

## 2022-11-17 RX ADMIN — PHENYLEPHRINE HYDROCHLORIDE 200 MCG: 10 INJECTION INTRAVENOUS at 13:59

## 2022-11-17 RX ADMIN — ACETAMINOPHEN 975 MG: 325 TABLET, FILM COATED ORAL at 21:07

## 2022-11-17 RX ADMIN — ACETAMINOPHEN 975 MG: 325 TABLET ORAL at 13:06

## 2022-11-17 RX ADMIN — PHENYLEPHRINE HYDROCHLORIDE 0.5 MCG/KG/MIN: 10 INJECTION INTRAVENOUS at 13:54

## 2022-11-17 RX ADMIN — SODIUM CITRATE AND CITRIC ACID MONOHYDRATE 30 ML: 500; 334 SOLUTION ORAL at 13:05

## 2022-11-17 RX ADMIN — SODIUM CHLORIDE, POTASSIUM CHLORIDE, SODIUM LACTATE AND CALCIUM CHLORIDE 1000 ML: 600; 310; 30; 20 INJECTION, SOLUTION INTRAVENOUS at 12:32

## 2022-11-17 RX ADMIN — ONDANSETRON 4 MG: 2 INJECTION INTRAMUSCULAR; INTRAVENOUS at 18:03

## 2022-11-17 RX ADMIN — METOCLOPRAMIDE HYDROCHLORIDE 10 MG: 5 INJECTION INTRAMUSCULAR; INTRAVENOUS at 19:05

## 2022-11-17 RX ADMIN — PHENYLEPHRINE HYDROCHLORIDE 200 MCG: 10 INJECTION INTRAVENOUS at 13:55

## 2022-11-17 RX ADMIN — PHENYLEPHRINE HYDROCHLORIDE 200 MCG: 10 INJECTION INTRAVENOUS at 14:03

## 2022-11-17 RX ADMIN — Medication 300 ML/HR: at 14:26

## 2022-11-17 RX ADMIN — Medication 10 MG: at 14:11

## 2022-11-17 RX ADMIN — ONDANSETRON 4 MG: 2 INJECTION INTRAMUSCULAR; INTRAVENOUS at 12:34

## 2022-11-17 RX ADMIN — Medication 0.15 MG: at 13:53

## 2022-11-17 RX ADMIN — BUPIVACAINE HYDROCHLORIDE IN DEXTROSE 1.6 ML: 7.5 INJECTION, SOLUTION SUBARACHNOID at 13:53

## 2022-11-17 RX ADMIN — SODIUM CHLORIDE, POTASSIUM CHLORIDE, SODIUM LACTATE AND CALCIUM CHLORIDE: 600; 310; 30; 20 INJECTION, SOLUTION INTRAVENOUS at 13:08

## 2022-11-17 RX ADMIN — BUPIVACAINE HYDROCHLORIDE 20 ML: 2.5 INJECTION, SOLUTION EPIDURAL; INFILTRATION; INTRACAUDAL at 15:05

## 2022-11-17 RX ADMIN — BUPIVACAINE 20 ML: 13.3 INJECTION, SUSPENSION, LIPOSOMAL INFILTRATION at 15:05

## 2022-11-17 RX ADMIN — GLYCOPYRROLATE 0.2 MG: 0.2 INJECTION, SOLUTION INTRAMUSCULAR; INTRAVENOUS at 14:00

## 2022-11-17 RX ADMIN — AZITHROMYCIN MONOHYDRATE 500 MG: 500 INJECTION, POWDER, LYOPHILIZED, FOR SOLUTION INTRAVENOUS at 13:52

## 2022-11-17 ASSESSMENT — ACTIVITIES OF DAILY LIVING (ADL)
ADLS_ACUITY_SCORE: 35
ADLS_ACUITY_SCORE: 18
DRESSING/BATHING_DIFFICULTY: NO
TOILETING_ISSUES: NO
DIFFICULTY_EATING/SWALLOWING: NO
WEAR_GLASSES_OR_BLIND: NO
CHANGE_IN_FUNCTIONAL_STATUS_SINCE_ONSET_OF_CURRENT_ILLNESS/INJURY: NO
WALKING_OR_CLIMBING_STAIRS_DIFFICULTY: NO
ADLS_ACUITY_SCORE: 35
FALL_HISTORY_WITHIN_LAST_SIX_MONTHS: NO
ADLS_ACUITY_SCORE: 18
CONCENTRATING,_REMEMBERING_OR_MAKING_DECISIONS_DIFFICULTY: NO
ADLS_ACUITY_SCORE: 18
ADLS_ACUITY_SCORE: 18
DOING_ERRANDS_INDEPENDENTLY_DIFFICULTY: NO

## 2022-11-17 NOTE — ANESTHESIA POSTPROCEDURE EVALUATION
Patient: Shanti Manuel    Procedure: Procedure(s):  REPEAT  SECTION       Anesthesia Type:  Spinal    Note:  Disposition: Inpatient   Postop Pain Control: Uneventful            Sign Out: Well controlled pain   PONV: No   Neuro/Psych: Uneventful            Sign Out: Acceptable/Baseline neuro status   Airway/Respiratory: Uneventful            Sign Out: Acceptable/Baseline resp. status   CV/Hemodynamics: Uneventful            Sign Out: Acceptable CV status; No obvious hypovolemia; No obvious fluid overload   Other NRE: NONE   DID A NON-ROUTINE EVENT OCCUR? No    Event details/Postop Comments:  Tolerated spinal anesthetic well. No apparent complications. Please call with any questions.            Last vitals:  Vitals:    22 1157 22 1527 22 1530   BP: 121/73 106/52 109/52   Pulse:  120 116   Resp:  20 18   Temp: 37  C (98.6  F) 36.9  C (98.5  F)    SpO2:  91%        Electronically Signed By: Shai Mazariegos MD  2022  3:43 PM

## 2022-11-17 NOTE — ANESTHESIA PROCEDURE NOTES
TAP Procedure Note    Pre-Procedure   Staff -        Anesthesiologist:  Shai Mazariegos MD       Performed By: anesthesiologist       Location: OR       Procedure Start/Stop Times: 11/17/2022 3:05 PM and 11/17/2022 3:10 PM       Pre-Anesthestic Checklist: patient identified, IV checked, site marked, risks and benefits discussed, informed consent, monitors and equipment checked, pre-op evaluation, at physician/surgeon's request and post-op pain management  Timeout:       Correct Patient: Yes        Correct Procedure: Yes        Correct Site: Yes        Correct Position: Yes        Correct Laterality: Yes        Site Marked: Yes  Procedure Documentation  Procedure: TAP       Diagnosis: POSTOP PAIN CONTROL PER SURGEON REQUEST       Laterality: bilateral       Patient Position: supine       Patient Prep/Sterile Barriers: sterile gloves, mask       Skin prep: Chloraprep       Needle Type: short bevel       Needle Gauge: 21.        Needle Length (Inches): 4        Ultrasound guided       1. Ultrasound was used to identify targeted nerve, plexus, vascular marker, or fascial plane and place a needle adjacent to it in real-time.       2. Ultrasound was used to visualize the spread of anesthetic in close proximity to the above referenced structure.       3. A permanent image is entered into the patient's record.       4. The visualized anatomic structures appeared normal.       5. There were no apparent abnormal pathologic findings.    Assessment/Narrative         The placement was negative for: blood aspirated, painful injection and site bleeding       Paresthesias: No.       Bolus given via needle..        Secured via.        Insertion/Infusion Method: Single Shot       Complications: none       Injection made incrementally with aspirations every 5 mL.    Medication(s) Administered   Bupivacaine 0.25% PF (Infiltration) - Infiltration   20 mL - 11/17/2022 3:05:00 PM  Bupivacaine liposome (Exparel) 1.3% LA inj susp  "(Infiltration) - Infiltration   20 mL - 11/17/2022 3:05:00 PM  Medication Administration Time: 11/17/2022 3:05 PM     Comments:  Bilateral TAP blocks for postop pain control per surgeon request. Divided doses. Each side with 10ml 0.25% bupi and 10ml exparel 1.33%      FOR [facility] ONLY:   Pain Team Contact information: please page the Pain Team Via [employer]. Search \"Pain\". During daytime hours, please page the attending first. At night please page the resident first.    "

## 2022-11-17 NOTE — ANESTHESIA PREPROCEDURE EVALUATION
Anesthesia Pre-Procedure Evaluation    Patient: Shanti Manuel   MRN: [id number] : 1990        Procedure : Procedure(s):  REPEAT  SECTION          History reviewed. No pertinent past medical history.   Past Surgical History:   Procedure Laterality Date      SECTION N/A 2020    Procedure:  SECTION;  Surgeon: Carol Trejo MD;  Location: [facility]D OR;  Service: Obstetrics     POLYPECTOMY  2019    removed from uterus      No Known Allergies   Social History     Tobacco Use     Smoking status: Never     Smokeless tobacco: Never   Substance Use Topics     Alcohol use: Never      Wt Readings from Last 1 Encounters:   22 95.3 kg (210 lb)        Anesthesia Evaluation   Pt has had prior anesthetic. Type: Regional and OB Labor Epidural.    History of anesthetic complications  - PONV.      ROS/MED HX  ENT/Pulmonary:  - neg pulmonary ROS     Neurologic:  - neg neurologic ROS     Cardiovascular:  - neg cardiovascular ROS     METS/Exercise Tolerance:     Hematologic:  - neg hematologic  ROS     Musculoskeletal:       GI/Hepatic:  - neg GI/hepatic ROS     Renal/Genitourinary:       Endo:  - neg endo ROS     Psychiatric/Substance Use:  - neg psychiatric ROS     Infectious Disease:       Malignancy:       Other:      (+) , previous ,         Physical Exam    Airway             Respiratory Devices and Support         Dental  no notable dental history         Cardiovascular   cardiovascular exam normal          Pulmonary   pulmonary exam normal            Other findings: Physiologic changes of pregnancy    OUTSIDE LABS:  CBC:   Lab Results   Component Value Date    WBC 11.0 2022    WBC 16.7 (H) 04/10/2020    HGB 10.9 (L) 2022    HGB 11.7 (L) 04/10/2020    HCT 34.0 (L) 2022    HCT 34.8 (L) 04/10/2020     2022     04/10/2020     BMP:   Lab Results   Component Value Date    CR 0.70 2020    CR 0.69 2020     COAGS:   Lab  Results   Component Value Date    PTT 28 2020    INR 0.87 (L) 2020     POC: No results found for: BGM, HCG, HCGS  HEPATIC:   Lab Results   Component Value Date    ALT <9 2020    AST 17 2020     OTHER:   Lab Results   Component Value Date    MAG 1.7 (L) 2020       Anesthesia Plan    ASA Status:  2, emergent       Anesthesia Type: Spinal.              Consents    Anesthesia Plan(s) and associated risks, benefits, and realistic alternatives discussed. Questions answered and patient/representative(s) expressed understanding.    - Discussed:     - Discussed with:  Patient         Postoperative Care            Comments:    Other Comments: Prior , now with nonreassuring FHT.    SAB with duramorph, tap blocks for postop pain control.   Recent Results (from the past 120 hour(s))  -CBC with platelets and differential:   Collection Time: 22 12:21 PM       Result                                            Value                         Ref Range                       WBC Count                                         11.0                          4.0 - 11.0 10e3/uL              RBC Count                                         4.14                          3.80 - 5.20 10e6/uL             Hemoglobin                                        10.9 (L)                      11.7 - 15.7 g/dL                Hematocrit                                        34.0 (L)                      35.0 - 47.0 %                   MCV                                               82                            78 - 100 fL                     MCH                                               26.3 (L)                      26.5 - 33.0 pg                  MCHC                                              32.1                          31.5 - 36.5 g/dL                RDW                                               13.0                          10.0 - 15.0 %                   Platelet Count                                     222                           150 - 450 10e3/uL               % Neutrophils                                     83                            %                               % Lymphocytes                                     3                             %                               % Monocytes                                       11                            %                               % Eosinophils                                     1                             %                               % Basophils                                       0                             %                               % Immature Granulocytes                           2                             %                               NRBCs per 100 WBC                                 0                             <1 /100                         Absolute Neutrophils                              9.2 (H)                       1.6 - 8.3 10e3/uL               Absolute Lymphocytes                              0.3 (L)                       0.8 - 5.3 10e3/uL               Absolute Monocytes                                1.2                           0.0 - 1.3 10e3/uL               Absolute Eosinophils                              0.1                           0.0 - 0.7 10e3/uL               Absolute Basophils                                0.0                           0.0 - 0.2 10e3/uL               Absolute Immature Granulocytes                    0.2                           <=0.4 10e3/uL                   Absolute NRBCs                                    0.0                           10e3/uL                           neg OB ROS.       Shai Mazariegos MD

## 2022-11-17 NOTE — ANESTHESIA CARE TRANSFER NOTE
Patient: Shanti Manuel    Procedure: Procedure(s):  REPEAT  SECTION       Diagnosis: Previous  delivery, antepartum [O34.219]  Diagnosis Additional Information: No value filed.    Anesthesia Type:   Spinal     Note:    Oropharynx: oropharynx clear of all foreign objects  Level of Consciousness: awake  Oxygen Supplementation: room air    Independent Airway: airway patency satisfactory and stable  Dentition: dentition unchanged  Vital Signs Stable: post-procedure vital signs reviewed and stable  Report to RN Given: handoff report given  Patient transferred to: Labor and Delivery    Handoff Report: Identifed the Patient, Identified the Reponsible Provider, Reviewed the pertinent medical history, Discussed the surgical course, Reviewed Intra-OP anesthesia mangement and issues during anesthesia, Set expectations for post-procedure period and Allowed opportunity for questions and acknowledgement of understanding      Vitals:  Vitals Value Taken Time   /52 22 1527   Temp 36.9  C (98.5  F) 22 1527   Pulse 120 22 1527   Resp 20 22 1527   SpO2 91 % 22 1527       Electronically Signed By: MICHELLE QUARLES CRNA  2022  3:28 PM

## 2022-11-17 NOTE — OP NOTE
SECTION OPERATIVE REPORT    Name:  Shanti Manuel  Location: [facility] OR  Procedure Date:  2022    Pre-Procedure Diagnosis:  1) 31 yo  at 37w3d with contractions  2) Fetal tachycardia  3) Breech presentation  4) Prior  section  5) Hx of preeclampsia in prior pregnancy  6) IVF pregnancy  7) Succenturiate placental lobe  8) GBS negative  9) COVID positive    Post-Procedure Diagnosis:    Same, plus  1) LGA infant (97%ile)  2) Keloid scar    Procedure:  1) Repeat low transverse   2) Revision of prior scar    Surgeon: Sangeetha Mills MD   Assistants: Norman Arriaga and Claudette Siddiqui CSTs    Anesthesia Type:  Regional with TAPS    Findings:  1) Viable female infant in breech presentation. Amniotic fluid clear and copious (~1200 ml). APGARS 7/9. Weight 9#4 (97%ile).  2) Placenta intact, 3vc. Succenturiate lobe noted. Sent to pathology.  3) Normal uterus, tubes, and ovaries  4) Mild fascial scarring    Complications:  None    Specimens:    Placenta       Estimated Blood Loss:   558 ml     Procedure:     Risks, benefits and alternatives were reviewed, including the risks of bleeding, infection and injury to pelvic structures including bladder, bowel and ureters.  After both verbal and written consent were obtained, the patient was taken to [facility] L& Operating Room where anesthesia was administered without difficulty.  Ann catheter was placed and preoperative antibiotics were administered.  The patient was prepped and draped in the usual sterile fashion in the dorsal supine position with a leftward tilt.    After testing for appropriate anesthesia, a Pfannenstiel skin incision was made with scalpel at her previous incision site and carried down to the underlying layer of fascia with both sharp and Bovie cautery.  The fascia was nicked in the midline and this incision was extended laterally with Bowens scissors.  The fascia was dissected off the underlying rectus muscles with  both sharp and blunt dissection.  The rectus muscle was  bluntly in the midline and the peritoneum was entered and  bluntly as well.  Bladder blade was inserted and the vesicouterine peritoneum was identified, and left intact.     The bladder blade was reinserted and the lower uterine segment was incised in a transverse fashion with scalpel and the incision was extended bluntly.  Amniotomy was performed with copious amounts of clear fluid noted.  The  breech was brought to the incision site and was delivered atraumatically using standard breech maneuvers. The remainder of the  was delivered, cord clamped x 2 and cut after 1 minute.   was handed off to waiting delivery team.    The placenta was delivered manually and the uterus was exteriorized and cleared of all clot and debris.  The lower uterine segment was repaired in a running locked fashion with 0-Monocryl.  A second layer was performed in an imbricating manner using 0-Monocryl.  The uterus was returned to the abdomen.  The hysterotomy site was examined and found to be hemostatic. Surgicel powder was placed over the incision  The peritoneum was not closed.    The rectus muscles were bluntly reapproximated in the midline. Additional powder was placed over the rectus muscles. The fascia was closed with 0-vicryl in a running fashion.  The subcutaneous tissues were examined and any bleeding was gently coagulated with Bovie cautery.  The subcuteaneous tissue was reapproximated using 3-0 Plain.      The left aspect of the skin incision had a keloid scar from the prior delivery. The keloid was grasped with Allis clamps and the scar was removed with the scalpel. The subcutaneous tissue was cauterized with the Bovie.The skin was closed with 3-0 Monocryl in a running fashion.  Steri-strips and bandages were applied.     The patient tolerated the procedure well.  All sponge, lap, and needle counts were correct x 2 by OR staff.  The  patient was transferred to her Recovery Room in stable condition.    Specimens: Placenta to pathology    Date: 11/17/2022  Time: 3:28 PM

## 2022-11-17 NOTE — PLAN OF CARE
Problem: Plan of Care - These are the overarching goals to be used throughout the patient stay.    Goal: Plan of Care Review  Description: The Plan of Care Review/Shift note should be completed every shift.  The Outcome Evaluation is a brief statement about your assessment that the patient is improving, declining, or no change.  This information will be displayed automatically on your shift note.  Outcome: Progressing

## 2022-11-17 NOTE — PLAN OF CARE
Problem: Postpartum ( Delivery)  Goal: Successful Maternal Role Transition  Outcome: Progressing     Problem: Postpartum ( Delivery)  Goal: Hemostasis  Outcome: Progressing     Problem: Postpartum ( Delivery)  Goal: Fluid and Electrolyte Balance  Outcome: Progressing     Problem: Postpartum ( Delivery)  Goal: Anesthesia/Sedation Recovery  Outcome: Progressing     Patient stable s/p , not on ERAS. Patient complains of nausea/vomiting after recovery; IV zofran administered x1. Patient able to stand at the bedside and sit at the edge of the bed. Requesting catheter stay in place until nausea subsides. Bonding well with baby, planning to exclusively pump; pump and parts at the bedside.  supportive with cares.

## 2022-11-17 NOTE — H&P
[facility] Labor and Delivery History and Physical    Shanti Manuel MRN# [id number]   Age: 32 year old YOB: 1990     Date of Admission:  2022    Primary care provider: Farheen Gong           Chief Complaint:   Shanti Manuel is a 32 year old female who is 37w3d pregnant and being admitted for uterine contractions. Has a history of a prior . This baby is breech and she is planning a repeat. On admission, baby was persistently tachycardic. Has received IV fluids with no improvement in fetal status.          Pregnancy history:     OBSTETRIC HISTORY:    OB History    Para Term  AB Living   2 1 1 0 0 1   SAB IAB Ectopic Multiple Live Births   0 0 0 0 1      # Outcome Date GA Lbr Deven/2nd Weight Sex Delivery Anes PTL Lv   2 Current            1 Term 20 38w3d  2.693 kg (5 lb 15 oz) F CS-LTranv EPI  ASHLYN      Complications: Fetal Intolerance, Preeclampsia/Hypertension      Name: ALEJANDRAFEMALE-SHANTI      Apgar1: 9  Apgar5: 9       EDC: Estimated Date of Delivery: 22    1) Prior , desires repeat  2) Breech presentation  3) Hx of preeclampsia in prior pregnancy  4) IVF pregnancy  5) Succenturiate lobe  6) GBS negative  7) COVID pending    Prenatal Labs:   Lab Results   Component Value Date    AS Negative 2020    HGB 10.9 (L) 2022       GBS Status:   No results found for: GBS    Active Problem List  Patient Active Problem List   Diagnosis     Severe pre-eclampsia in third trimester      delivery delivered     Fetal heart rate decelerations, delivered     38 weeks gestation of pregnancy       Medication Prior to Admission  Medications Prior to Admission   Medication Sig Dispense Refill Last Dose     acetaminophen (TYLENOL) 325 MG tablet [ACETAMINOPHEN (TYLENOL) 325 MG TABLET] Take 3 tablets (975 mg total) by mouth every 6 (six) hours. (Patient not taking: Reported on 2022) 20 tablet 1      ascorbic  acid, vitamin C, (VITAMIN C) 1000 MG tablet [ASCORBIC ACID, VITAMIN C, (VITAMIN C) 1000 MG TABLET] Take 1,000 mg by mouth daily. (Patient not taking: Reported on 5/5/2022)        cholecalciferol, vitamin D3, 1,000 unit (25 mcg) tablet [CHOLECALCIFEROL, VITAMIN D3, 1,000 UNIT (25 MCG) TABLET] Take 2,000 Units by mouth daily. (Patient not taking: Reported on 5/5/2022)        ibuprofen (ADVIL,MOTRIN) 800 MG tablet [IBUPROFEN (ADVIL,MOTRIN) 800 MG TABLET] Take 1 tablet (800 mg total) by mouth every 6 (six) hours. 30 tablet 1      oxyCODONE (ROXICODONE) 5 MG immediate release tablet [OXYCODONE (ROXICODONE) 5 MG IMMEDIATE RELEASE TABLET] Take 1-2 tablets (5-10 mg total) by mouth every 4 (four) hours as needed. 13 tablet 0      prenatal no115-iron-folic acid 29 mg iron- 1 mg Chew [PRENATAL -IRON-FOLIC ACID 29 MG IRON- 1 MG CHEW] Chew daily.        progesterone, in sesame oil, 50 MG/ML injection ADMINISTER 1 ML IN THE MUSCLE EVERY OTHER DAY AS DIRECTED      .        Maternal Past Medical History:   No past medical history on file.                    Family History:   No family history on file.  Family history reviewed            Social History:     Social History     Tobacco Use     Smoking status: Never     Smokeless tobacco: Never   Substance Use Topics     Alcohol use: Never            Review of Systems:   The Review of Systems is negative other than noted in the HPI          Physical Exam:   Vitals were reviewed  Temp: 98.6  F (37  C) Temp src: Oral BP: 121/73              Constitutional:   awake, alert, cooperative, slightly upset but otherwise no apparent distress, and appears stated age     Lungs:   No increased work of breathing, good air exchange     Abdomen:   Gravid, soft, non-distended, non-tender     Neuropsychiatric:   General: normal and normal eye contact  Level of consciousness: alert / normal  Affect: normal      Cervix:   Membranes: intact   Dilation: closed likely  Presentation:Breech  Fetal Heart  Rate Tracing: persistent tachycardia  Tocometer: external monitor and q 3-5 minutes                       Assessment:   Shanti Manuel is a 37w3d pregnant female admitted with contractions. Found to have fetal tachycardia on admission.          Plan:   Admit - see IP orders  Prepare for  section  Anesthesia and Charge RN notified    Sangeetha Mills MD     30 minutes was spent in chart review, discussion with the patient, and charting, with > 50% percent spent in face to face counseling and coordination of care.

## 2022-11-17 NOTE — ANESTHESIA PROCEDURE NOTES
"Intrathecal injection Procedure Note    Pre-Procedure   Staff -        Anesthesiologist:  Shai Mazariegos MD       Performed By: anesthesiologist       Location: OR       Procedure Start/Stop Times: 11/17/2022 1:53 PM and 11/17/2022 1:57 PM       Pre-Anesthestic Checklist: patient identified, IV checked, risks and benefits discussed, informed consent, monitors and equipment checked, pre-op evaluation, at physician/surgeon's request and post-op pain management  Timeout:       Correct Patient: Yes        Correct Procedure: Yes        Correct Site: Yes        Correct Position: Yes   Procedure Documentation  Procedure: intrathecal injection       Diagnosis: surgical anesthetic       Patient Position: sitting       Patient Prep/Sterile Barriers: sterile gloves, mask, patient draped       Skin prep: Chloraprep       Insertion Site: L3-4. (midline approach).       Needle Gauge: 25.        Needle Length (Inches): 3.5        Spinal Needle Type: Pencan       Introducer used       Introducer: 20 G       # of attempts: 1 and  # of redirects:  1    Assessment/Narrative         Paresthesias: Yes.       CSF fluid: clear.       Opening pressure was cmH2O while  Supine.      Medication(s) Administered   0.75% Hyperbaric Bupivacaine (Intrathecal) - Intrathecal   1.6 mL - 11/17/2022 1:53:00 PM  Morphine PF 1 mg/mL (Intrathecal) - Intrathecal   0.15 mg - 11/17/2022 1:53:00 PM  Medication Administration Time: 11/17/2022 1:53 PM     Comments:  Smooth aspiration of CSF before/after injection of local.       FOR [facility] ([facility]) ONLY:   Pain Team Contact information: please page the Pain Team Via [employer]. Search \"Pain\". During daytime hours, please page the attending first. At night please page the resident first.    "

## 2022-11-18 PROBLEM — O14.13 SEVERE PRE-ECLAMPSIA IN THIRD TRIMESTER: Status: RESOLVED | Noted: 2020-04-08 | Resolved: 2022-11-18

## 2022-11-18 PROBLEM — Z3A.38 38 WEEKS GESTATION OF PREGNANCY: Status: RESOLVED | Noted: 2020-04-09 | Resolved: 2022-11-18

## 2022-11-18 PROBLEM — U07.1 COVID-19 AFFECTING CHILDBIRTH: Status: ACTIVE | Noted: 2022-11-18

## 2022-11-18 LAB
HGB BLD-MCNC: 10.1 G/DL (ref 11.7–15.7)
T PALLIDUM AB SER QL: NONREACTIVE

## 2022-11-18 PROCEDURE — 36415 COLL VENOUS BLD VENIPUNCTURE: CPT | Performed by: OBSTETRICS & GYNECOLOGY

## 2022-11-18 PROCEDURE — 250N000011 HC RX IP 250 OP 636: Performed by: OBSTETRICS & GYNECOLOGY

## 2022-11-18 PROCEDURE — 85018 HEMOGLOBIN: CPT | Performed by: OBSTETRICS & GYNECOLOGY

## 2022-11-18 PROCEDURE — 120N000001 HC R&B MED SURG/OB

## 2022-11-18 PROCEDURE — 999N000249 HC STATISTIC C-SECTION ON UNIT

## 2022-11-18 PROCEDURE — C9803 HOPD COVID-19 SPEC COLLECT: HCPCS

## 2022-11-18 PROCEDURE — 250N000013 HC RX MED GY IP 250 OP 250 PS 637: Performed by: OBSTETRICS & GYNECOLOGY

## 2022-11-18 RX ADMIN — ACETAMINOPHEN 975 MG: 325 TABLET, FILM COATED ORAL at 08:28

## 2022-11-18 RX ADMIN — IBUPROFEN 800 MG: 800 TABLET ORAL at 14:51

## 2022-11-18 RX ADMIN — SENNOSIDES AND DOCUSATE SODIUM 1 TABLET: 50; 8.6 TABLET ORAL at 21:21

## 2022-11-18 RX ADMIN — ACETAMINOPHEN 975 MG: 325 TABLET, FILM COATED ORAL at 21:21

## 2022-11-18 RX ADMIN — ACETAMINOPHEN 975 MG: 325 TABLET, FILM COATED ORAL at 14:51

## 2022-11-18 RX ADMIN — SENNOSIDES AND DOCUSATE SODIUM 1 TABLET: 50; 8.6 TABLET ORAL at 12:51

## 2022-11-18 RX ADMIN — KETOROLAC TROMETHAMINE 30 MG: 30 INJECTION, SOLUTION INTRAMUSCULAR; INTRAVENOUS at 02:25

## 2022-11-18 RX ADMIN — ACETAMINOPHEN 975 MG: 325 TABLET, FILM COATED ORAL at 02:24

## 2022-11-18 RX ADMIN — IBUPROFEN 800 MG: 800 TABLET ORAL at 21:21

## 2022-11-18 RX ADMIN — KETOROLAC TROMETHAMINE 30 MG: 30 INJECTION, SOLUTION INTRAMUSCULAR; INTRAVENOUS at 08:28

## 2022-11-18 ASSESSMENT — ACTIVITIES OF DAILY LIVING (ADL)
ADLS_ACUITY_SCORE: 18

## 2022-11-18 NOTE — PROGRESS NOTES
[facility] Obstetrics Post-Op / Progress Note         Assessment and Plan:    Assessment:   Post-operative day #1  Low transverse repeat  section  L&D complications: Contractions  Fetal tachycardia  Breech presentation  Prior   COVID positive  IVF pregnancy  LGA infant      Doing well.  Clean wound without signs of infection.  Normal healing wound.  No excessive bleeding  Pain well-controlled.      Plan:   Ambulation encouraged  Monitor wound for signs of infection  Pain control measures as needed  Anticipate discharge tomorrow           Interval History:   Doing well.  Pain is well-controlled.  No fevers.  No history of wound drainage, warmth or significant erythema.  Good appetite.  Denies chest pain, shortness of breath, nausea or vomiting.  Ambulatory.  Breastfeeding well.          Significant Problems:    Principal Problem:     delivery delivered  Active Problems:    COVID-19 affecting childbirth    Fetal tachycardia, delivered, current hospitalization            Review of Systems:    The patient denies any chest pain, shortness of breath, excessive pain, fever, chills, purulent drainage from the wound, nausea or vomiting.          Medications:     All medications related to the patient's surgery have been reviewed    acetaminophen  975 mg Oral Q6H     ibuprofen  800 mg Oral Q6H     senna-docusate  1 tablet Oral BID    Or     senna-docusate  2 tablet Oral BID     sodium chloride (PF)  3 mL Intracatheter Q8H             Physical Exam:     All vitals stable  Patient Vitals for the past 8 hrs:   BP Temp Temp src Pulse Resp   22 0830 104/70 98.1  F (36.7  C) Oral 92 16     Wound clean and dry with minimal or no drainage.  Surrounding skin with minimal erythema. Mepilex in place.          Data:     All laboratory data related to this surgery reviewed  Hemoglobin   Date Value Ref Range Status   2022 10.1 (L) 11.7 - 15.7 g/dL Final   2022 10.9 (L) 11.7 - 15.7  g/dL Final   04/10/2020 11.7 (L) 12.0 - 16.0 g/dL Final   04/08/2020 13.3 12.0 - 16.0 g/dL Final   04/08/2020 13.1 12.0 - 16.0 g/dL Final     All imaging studies related to this surgery reviewed    Sangeetha Mills MD

## 2022-11-19 VITALS
BODY MASS INDEX: 33.75 KG/M2 | DIASTOLIC BLOOD PRESSURE: 60 MMHG | RESPIRATION RATE: 16 BRPM | SYSTOLIC BLOOD PRESSURE: 128 MMHG | TEMPERATURE: 97.2 F | HEIGHT: 66 IN | OXYGEN SATURATION: 97 % | HEART RATE: 100 BPM | WEIGHT: 210 LBS

## 2022-11-19 PROCEDURE — 250N000013 HC RX MED GY IP 250 OP 250 PS 637: Performed by: OBSTETRICS & GYNECOLOGY

## 2022-11-19 RX ORDER — IBUPROFEN 800 MG/1
800 TABLET, FILM COATED ORAL EVERY 8 HOURS PRN
Qty: 30 TABLET | Refills: 1 | Status: SHIPPED | OUTPATIENT
Start: 2022-11-19

## 2022-11-19 RX ORDER — OXYCODONE HYDROCHLORIDE 5 MG/1
5 TABLET ORAL EVERY 6 HOURS PRN
Qty: 12 TABLET | Refills: 0 | Status: SHIPPED | OUTPATIENT
Start: 2022-11-19 | End: 2022-11-22

## 2022-11-19 RX ADMIN — IBUPROFEN 800 MG: 800 TABLET ORAL at 09:14

## 2022-11-19 RX ADMIN — ACETAMINOPHEN 975 MG: 325 TABLET, FILM COATED ORAL at 09:01

## 2022-11-19 RX ADMIN — IBUPROFEN 800 MG: 800 TABLET ORAL at 03:33

## 2022-11-19 RX ADMIN — ACETAMINOPHEN 975 MG: 325 TABLET, FILM COATED ORAL at 03:32

## 2022-11-19 ASSESSMENT — ACTIVITIES OF DAILY LIVING (ADL)
ADLS_ACUITY_SCORE: 18

## 2022-11-19 NOTE — PLAN OF CARE
"  Problem: Plan of Care - These are the overarching goals to be used throughout the patient stay.    Goal: Optimal Comfort and Wellbeing  Outcome: Progressing     Problem: Plan of Care - These are the overarching goals to be used throughout the patient stay.    Goal: Patient-Specific Goal (Individualized)  Description: You can add care plan individualizations to a care plan. Examples of Individualization might be:  \"Parent requests to be called daily at 9am for status\", \"I have a hard time hearing out of my right ear\", or \"Do not touch me to wake me up as it startles me\".  Outcome: Progressing     Patients vitals are stable. Will continue to monitor.     Bea Ibarra RN    "

## 2022-11-19 NOTE — DISCHARGE SUMMARY
[facility] Discharge Summary    Shanti Manuel MRN# [id number]   Age: 32 year old YOB: 1990     Date of Admission:  2022  Date of Discharge::  2022  Admitting Physician:  Sangeetha Mills MD  Discharge Physician:  Sangeetha Mills MD     Home clinic: [facility]          Admission Diagnoses:   1) 31 yo  at 37w3d with contractions  2) Fetal tachycardia  3) Breech presentation  4) Prior  section  5) Hx of preeclampsia in prior pregnancy  6) IVF pregnancy  7) Succenturiate placental lobe  8) GBS negative  9) COVID unknown          Discharge Diagnosis:   COVID positive  Intrauterine pregnancy at 37w3d gestation  Repeat  section  LGA female infant, 9#4 (97%ile), APGARs 7/9          Procedures:   Procedure(s): Repeat low transverse  section       No other procedures performed during this admission           Medications Prior to Admission:     Medications Prior to Admission   Medication Sig Dispense Refill Last Dose     prenatal no115-iron-folic acid 29 mg iron- 1 mg Chew [PRENATAL -IRON-FOLIC ACID 29 MG IRON- 1 MG CHEW] Chew daily.        [DISCONTINUED] acetaminophen (TYLENOL) 325 MG tablet [ACETAMINOPHEN (TYLENOL) 325 MG TABLET] Take 3 tablets (975 mg total) by mouth every 6 (six) hours. (Patient not taking: Reported on 2022) 20 tablet 1      [DISCONTINUED] ascorbic acid, vitamin C, (VITAMIN C) 1000 MG tablet [ASCORBIC ACID, VITAMIN C, (VITAMIN C) 1000 MG TABLET] Take 1,000 mg by mouth daily. (Patient not taking: Reported on 2022)        [DISCONTINUED] cholecalciferol, vitamin D3, 1,000 unit (25 mcg) tablet [CHOLECALCIFEROL, VITAMIN D3, 1,000 UNIT (25 MCG) TABLET] Take 2,000 Units by mouth daily. (Patient not taking: Reported on 2022)        [DISCONTINUED] ibuprofen (ADVIL,MOTRIN) 800 MG tablet [IBUPROFEN (ADVIL,MOTRIN) 800 MG TABLET] Take 1 tablet (800 mg total) by mouth every 6 (six) hours. 30 tablet 1      [DISCONTINUED]  oxyCODONE (ROXICODONE) 5 MG immediate release tablet [OXYCODONE (ROXICODONE) 5 MG IMMEDIATE RELEASE TABLET] Take 1-2 tablets (5-10 mg total) by mouth every 4 (four) hours as needed. 13 tablet 0      [DISCONTINUED] progesterone, in sesame oil, 50 MG/ML injection ADMINISTER 1 ML IN THE MUSCLE EVERY OTHER DAY AS DIRECTED                Discharge Medications:     Current Discharge Medication List      CONTINUE these medications which have CHANGED    Details   ibuprofen (ADVIL/MOTRIN) 800 MG tablet Take 1 tablet (800 mg) by mouth every 8 hours as needed for moderate pain (4-6)  Qty: 30 tablet, Refills: 1    Associated Diagnoses:  delivery delivered      oxyCODONE (ROXICODONE) 5 MG tablet Take 1 tablet (5 mg) by mouth every 6 hours as needed for pain  Qty: 12 tablet, Refills: 0    Associated Diagnoses:  delivery delivered         CONTINUE these medications which have NOT CHANGED    Details   prenatal no115-iron-folic acid 29 mg iron- 1 mg Chew [PRENATAL -IRON-FOLIC ACID 29 MG IRON- 1 MG CHEW] Chew daily.         STOP taking these medications       acetaminophen (TYLENOL) 325 MG tablet Comments:   Reason for Stopping:         ascorbic acid, vitamin C, (VITAMIN C) 1000 MG tablet Comments:   Reason for Stopping:         cholecalciferol, vitamin D3, 1,000 unit (25 mcg) tablet Comments:   Reason for Stopping:         progesterone, in sesame oil, 50 MG/ML injection Comments:   Reason for Stopping:                     Consultations:   No consultations were requested during this admission          Brief History of Labor or Admission:   Presented with contractions, persistent fetal tachycardia found despite resuscitation measures. Underwent repeat . COVID positive status resulted during surgery. Normal postop course.            Hospital Course:   The patient's hospital course was unremarkable.  She recovered as anticipated and experienced no post-operative complications. On discharge, her pain was well  controlled. Vaginal bleeding is similar to peak menstrual flow.  Voiding without difficulty.  Ambulating well and tolerating a normal diet.  No fever or significant wound drainage.  Breastfeeding well.  Infant is stable. She was discharged on post-partum day #2.    Post-partum hemoglobin:   Hemoglobin   Date Value Ref Range Status   11/18/2022 10.1 (L) 11.7 - 15.7 g/dL Final             Discharge Instructions and Follow-Up:   Discharge diet: Regular   Discharge activity: No heavy lifting, pushing, pulling for 6 week(s)  No driving or operating machinery while on narcotic analgesics  Pelvic rest: abstain from intercourse and do not use tampons for 6 week(s)   Discharge follow-up: Follow up with [facility] in 2 and 6 weeks   Wound care: Drink plenty of fluids  Ice to area for comfort  Keep wound clean and dry  Steri-strips off in 14 days           Discharge Disposition:   Discharged to home      Attestation:  I have reviewed today's vital signs, notes, medications, labs and imaging.    Sangeetha Mills MD

## 2022-11-19 NOTE — PLAN OF CARE
Problem: Plan of Care - These are the overarching goals to be used throughout the patient stay.    Goal: Optimal Comfort and Wellbeing  Intervention: Monitor Pain and Promote Comfort  Recent Flowsheet Documentation  Taken 2022 0900 by Mariola Looney RN  Pain Management Interventions:   medication (see MAR)   ambulation/increased activity     Problem: Postpartum ( Delivery)  Goal: Optimal Pain Control and Function  Outcome: Adequate for Care Transition    VS stable. Fundus firm, bleeding minimal. Pain well controlled with tylenol and ibuprofen. Bonding well with infant. Continues to pump and bottle feed.

## 2022-11-19 NOTE — DISCHARGE SUMMARY
Discharge education provided. Questions encouraged and answered. Mother verbalized understanding. Walked to vehicle by staff.

## 2022-11-19 NOTE — DISCHARGE INSTRUCTIONS
After a   It can take time to recover fully after a . It s important to take care of yourself--both for your own sake and because your new baby needs you.   Incision care  Tips for taking care of your incision include:  You will likely be able to shower and pat the incision dry.  Watch your incision for signs of infection. These include redness that gets worse or fluid draining from it.  Hold a pillow against the incision when you get up from a lying or sitting position. Also do this when you laugh or cough.  Don't do any heavy lifting. Don t lift anything heavier than your baby until your healthcare provider tells you otherwise.  When to call your healthcare provider  Call your healthcare provider if you have:  A fever of 100.4  F ( 38 C) or higher, or as directed by your provider  Redness, pain, or discharge at the incision site that gets worse  Vaginal bleeding that soaks through a pad per hour or large blood clots  Severe pain in your stomach  No bowel movement within 1 week after the birth of your baby  Vaginal discharge that has a foul odor  Swollen, red, and painful area in the leg  Burning when urinating or blood in the urine  A rash or hives  Sore, red, painful area on the breasts (may also have flu-like symptoms)  Feelings of anxiety, panic, and depression, or trouble bonding with your baby  [employer] last reviewed this educational content on 2020-2021 [employer]. All rights reserved. This information is not intended as a substitute for professional medical care. Always follow your healthcare professional's instructions.

## 2022-11-19 NOTE — PLAN OF CARE
Pt doing well status post . Bonding noted with infant.  Vitals stable, up with good strength.  Will continue to assist & evaluate.

## 2022-11-20 ENCOUNTER — TELEPHONE (OUTPATIENT)
Dept: NURSING | Facility: CLINIC | Age: 32
End: 2022-11-20

## 2022-11-20 NOTE — TELEPHONE ENCOUNTER
Patient calling. She had a  on Thursday. Patient is wanting to have a belly band to aide with her recovery. Patient states that she had one with her previous  but is unsure about buying one from [employer] with concerns of getting the wrong size or having it on too tight.   Patient asking for advice on a belly band and if she could get one from the hospital.   Connected patient with [facility] by accident, meant to conference call. Call to [facility] and [facility] who reports that they spoke with patient and  is coming in to  a belly band.   Yarely Rod RN   22 9:34 AM  [facility] Nurse Advisor

## 2022-11-21 LAB
PATH REPORT.COMMENTS IMP SPEC: NORMAL
PATH REPORT.COMMENTS IMP SPEC: NORMAL
PATH REPORT.FINAL DX SPEC: NORMAL
PATH REPORT.GROSS SPEC: NORMAL
PATH REPORT.MICROSCOPIC SPEC OTHER STN: NORMAL
PATH REPORT.RELEVANT HX SPEC: NORMAL
PHOTO IMAGE: NORMAL

## 2022-11-21 PROCEDURE — 88307 TISSUE EXAM BY PATHOLOGIST: CPT | Mod: 26 | Performed by: PATHOLOGY

## 2023-01-20 ENCOUNTER — MEDICAL CORRESPONDENCE (OUTPATIENT)
Dept: HEALTH INFORMATION MANAGEMENT | Facility: CLINIC | Age: 33
End: 2023-01-20

## 2023-03-22 ENCOUNTER — MEDICAL CORRESPONDENCE (OUTPATIENT)
Dept: HEALTH INFORMATION MANAGEMENT | Facility: CLINIC | Age: 33
End: 2023-03-22

## 2023-05-24 ENCOUNTER — MEDICAL CORRESPONDENCE (OUTPATIENT)
Dept: HEALTH INFORMATION MANAGEMENT | Facility: CLINIC | Age: 33
End: 2023-05-24
Payer: COMMERCIAL

## 2023-06-04 ENCOUNTER — HEALTH MAINTENANCE LETTER (OUTPATIENT)
Age: 33
End: 2023-06-04

## 2024-02-01 ENCOUNTER — TRANSFERRED RECORDS (OUTPATIENT)
Dept: MULTI SPECIALTY CLINIC | Facility: CLINIC | Age: 34
End: 2024-02-01

## 2024-02-01 LAB — PAP SMEAR - HIM PATIENT REPORTED: NORMAL

## 2024-07-14 ENCOUNTER — HEALTH MAINTENANCE LETTER (OUTPATIENT)
Age: 34
End: 2024-07-14

## 2024-12-03 ENCOUNTER — OFFICE VISIT (OUTPATIENT)
Dept: FAMILY MEDICINE | Facility: CLINIC | Age: 34
End: 2024-12-03
Payer: COMMERCIAL

## 2024-12-03 VITALS
WEIGHT: 194 LBS | TEMPERATURE: 98.8 F | HEIGHT: 66 IN | DIASTOLIC BLOOD PRESSURE: 65 MMHG | BODY MASS INDEX: 31.18 KG/M2 | HEART RATE: 89 BPM | RESPIRATION RATE: 16 BRPM | OXYGEN SATURATION: 99 % | SYSTOLIC BLOOD PRESSURE: 128 MMHG

## 2024-12-03 DIAGNOSIS — F41.1 GAD (GENERALIZED ANXIETY DISORDER): Primary | ICD-10-CM

## 2024-12-03 PROCEDURE — 99214 OFFICE O/P EST MOD 30 MIN: CPT | Performed by: NURSE PRACTITIONER

## 2024-12-03 PROCEDURE — 36415 COLL VENOUS BLD VENIPUNCTURE: CPT | Performed by: NURSE PRACTITIONER

## 2024-12-03 PROCEDURE — 84443 ASSAY THYROID STIM HORMONE: CPT | Performed by: NURSE PRACTITIONER

## 2024-12-03 RX ORDER — ASPIRIN 81 MG/1
81 TABLET ORAL
COMMUNITY
End: 2024-12-03

## 2024-12-03 RX ORDER — TRIAMCINOLONE ACETONIDE 1 MG/G
OINTMENT TOPICAL
COMMUNITY
Start: 2024-09-28

## 2024-12-03 RX ORDER — LEVOTHYROXINE SODIUM 100 UG/1
50 TABLET ORAL
COMMUNITY
End: 2024-12-03

## 2024-12-03 RX ORDER — FLUOXETINE 10 MG/1
10 CAPSULE ORAL DAILY
Qty: 30 CAPSULE | Refills: 5 | Status: SHIPPED | OUTPATIENT
Start: 2024-12-03

## 2024-12-03 RX ORDER — NORGESTIMATE AND ETHINYL ESTRADIOL 0.25-0.035
KIT ORAL
COMMUNITY
Start: 2024-11-22

## 2024-12-03 ASSESSMENT — ANXIETY QUESTIONNAIRES
3. WORRYING TOO MUCH ABOUT DIFFERENT THINGS: NEARLY EVERY DAY
7. FEELING AFRAID AS IF SOMETHING AWFUL MIGHT HAPPEN: NEARLY EVERY DAY
6. BECOMING EASILY ANNOYED OR IRRITABLE: NEARLY EVERY DAY
4. TROUBLE RELAXING: NEARLY EVERY DAY
IF YOU CHECKED OFF ANY PROBLEMS ON THIS QUESTIONNAIRE, HOW DIFFICULT HAVE THESE PROBLEMS MADE IT FOR YOU TO DO YOUR WORK, TAKE CARE OF THINGS AT HOME, OR GET ALONG WITH OTHER PEOPLE: SOMEWHAT DIFFICULT
7. FEELING AFRAID AS IF SOMETHING AWFUL MIGHT HAPPEN: NEARLY EVERY DAY
GAD7 TOTAL SCORE: 19
2. NOT BEING ABLE TO STOP OR CONTROL WORRYING: NEARLY EVERY DAY
GAD7 TOTAL SCORE: 19
1. FEELING NERVOUS, ANXIOUS, OR ON EDGE: NEARLY EVERY DAY
8. IF YOU CHECKED OFF ANY PROBLEMS, HOW DIFFICULT HAVE THESE MADE IT FOR YOU TO DO YOUR WORK, TAKE CARE OF THINGS AT HOME, OR GET ALONG WITH OTHER PEOPLE?: SOMEWHAT DIFFICULT
GAD7 TOTAL SCORE: 19
5. BEING SO RESTLESS THAT IT IS HARD TO SIT STILL: SEVERAL DAYS

## 2024-12-03 ASSESSMENT — PATIENT HEALTH QUESTIONNAIRE - PHQ9
SUM OF ALL RESPONSES TO PHQ QUESTIONS 1-9: 10
SUM OF ALL RESPONSES TO PHQ QUESTIONS 1-9: 10
10. IF YOU CHECKED OFF ANY PROBLEMS, HOW DIFFICULT HAVE THESE PROBLEMS MADE IT FOR YOU TO DO YOUR WORK, TAKE CARE OF THINGS AT HOME, OR GET ALONG WITH OTHER PEOPLE: SOMEWHAT DIFFICULT

## 2024-12-03 ASSESSMENT — ENCOUNTER SYMPTOMS: NERVOUS/ANXIOUS: 1

## 2024-12-03 NOTE — PROGRESS NOTES
"  Assessment & Plan     AARTI (generalized anxiety disorder)  Presents for evaluation of worsening anxiety with a AARTI-7 score of 19 with some depression symptoms as well but primarily concern for anxiety.  She has significant trouble with worrying and overthinking, rarely focused on her children.  We discussed treatment options including pharmacotherapy, counseling, lifestyle modifications and self-care.  She is willing to try medication as she feels she is missing out on the joyful things in their life.  She has some concern for weight gain so recommend fluoxetine as it is weight neutral.  We discussed common side effects.  Will start at 10 mg with follow-up in 4 weeks.  Could consider increasing dose at that time.  Did make a referral as well to behavioral health clinician, Ellen Shin, here for cough clinic.  Staff message will be sent.  She has history of abnormal TSH levels with hide treatments so I think it is prudent to recheck TSH levels to see if this could be contributing to recent worsening of her anxiety.  Recommend follow-up sooner if she is having any new or concerning symptoms.  - FLUoxetine (PROZAC) 10 MG capsule; Take 1 capsule (10 mg) by mouth daily.  - PRIMARY CARE FOLLOW-UP SCHEDULING; Future  - Adult Mental Health  Referral; Future  - TSH with free T4 reflex; Future  - TSH with free T4 reflex          BMI  Estimated body mass index is 31.31 kg/m  as calculated from the following:    Height as of this encounter: 1.676 m (5' 6\").    Weight as of this encounter: 88 kg (194 lb).       Depression Screening Follow Up        12/3/2024     4:06 PM   PHQ   PHQ-9 Total Score 10    Q9: Thoughts of better off dead/self-harm past 2 weeks Not at all        Patient-reported           Follow Up Actions Taken  Crisis resource information provided in After Visit Summary  Mental Health Referral placed           Subjective   Glenys is a 34 year old, presenting for the following health issues:  Anxiety " (Pt has had anxiety for a long time, the last few months she seems to have noticed it is harder to handle the anxiety )        12/3/2024     4:23 PM   Additional Questions   Roomed by TIFFANIE Peralta     Via the Health Maintenance questionnaire, the patient has reported the following services have been completed -Cervical Cancer Screening: [facility]- [place] 2024-02-01, this information has been sent to the abstraction team.  Glenys is a very pleasant 34-year-old female,  with 2 daughters ages 2 and 4 who presents today for evaluation for worsening stress and anxiety.  States she has always been somewhat of a worrier but this worsened after having children and states her worry is affecting her relationships and she worries it is keeping her from experiencing joyful moments.    Describes she is primarily worried about her daughters.  She worries if she and her  are together but not with her daughters that something will happen to them and they will not have parents.  Describes a lot of stress and anxiety.  States her brain is always spinning, sleep is getting more difficult and she also notices weight gain although she has loss of appetite.  She feels a lack of energy.  Describes history of mild postpartum depression with her girls but never needing medication.  Her oldest daughter was a COVID baby born in April 2020, so there was a lot of isolation.  She feels like a helicopter parent and her mother has expressed similar thoughts.  States there is not a moment that she is not worrying about something.  Does endorse some depressive thoughts but no thoughts of harming herself.      History of Present Illness       Mental Health Follow-up:  Patient presents to follow-up on Anxiety.    Patient's anxiety since last visit has been:  No change  The patient is having other symptoms associated with anxiety.  Any significant life events: relationship concerns, job concerns, financial  "concerns and health concerns  Patient is not feeling anxious or having panic attacks.  Patient has no concerns about alcohol or drug use.   She is taking medications regularly.                 Review of Systems  Constitutional, neuro, ENT, endocrine, pulmonary, cardiac, gastrointestinal, genitourinary, musculoskeletal, integument and psychiatric systems are negative, except as otherwise noted.      Objective    /65 (BP Location: Right arm, Patient Position: Sitting, Cuff Size: Adult Large)   Pulse 89   Temp 98.8  F (37.1  C) (Tympanic)   Resp 16   Ht 1.676 m (5' 6\")   Wt 88 kg (194 lb)   SpO2 99%   BMI 31.31 kg/m    Body mass index is 31.31 kg/m .  Physical Exam  Constitutional:       General: She is not in acute distress.     Appearance: Normal appearance. She is not ill-appearing.   Neurological:      Mental Status: She is alert and oriented to person, place, and time.   Psychiatric:         Attention and Perception: Attention and perception normal.         Mood and Affect: Mood is anxious. Affect is tearful.         Speech: Speech normal. Speech is not rapid and pressured or tangential.         Behavior: Behavior normal. Behavior is cooperative.         Thought Content: Thought content normal.         Cognition and Memory: Cognition and memory normal.         Judgment: Judgment normal.                      12/3/2024     4:07 PM   AARTI-7 SCORE   Total Score 19 (severe anxiety)   Total Score 19        Patient-reported         12/3/2024     4:06 PM   PHQ   PHQ-9 Total Score 10    Q9: Thoughts of better off dead/self-harm past 2 weeks Not at all        Patient-reported         Signed Electronically by: MICHELLE Luna CNP    "

## 2024-12-04 LAB — TSH SERPL DL<=0.005 MIU/L-ACNC: 2 UIU/ML (ref 0.3–4.2)

## 2024-12-19 ENCOUNTER — VIRTUAL VISIT (OUTPATIENT)
Dept: BEHAVIORAL HEALTH | Facility: CLINIC | Age: 34
End: 2024-12-19
Attending: NURSE PRACTITIONER
Payer: COMMERCIAL

## 2024-12-19 DIAGNOSIS — F41.1 GAD (GENERALIZED ANXIETY DISORDER): ICD-10-CM

## 2024-12-19 PROCEDURE — 90834 PSYTX W PT 45 MINUTES: CPT | Mod: 95 | Performed by: SOCIAL WORKER

## 2024-12-19 ASSESSMENT — ANXIETY QUESTIONNAIRES
7. FEELING AFRAID AS IF SOMETHING AWFUL MIGHT HAPPEN: NEARLY EVERY DAY
5. BEING SO RESTLESS THAT IT IS HARD TO SIT STILL: NEARLY EVERY DAY
IF YOU CHECKED OFF ANY PROBLEMS ON THIS QUESTIONNAIRE, HOW DIFFICULT HAVE THESE PROBLEMS MADE IT FOR YOU TO DO YOUR WORK, TAKE CARE OF THINGS AT HOME, OR GET ALONG WITH OTHER PEOPLE: EXTREMELY DIFFICULT
GAD7 TOTAL SCORE: 20
2. NOT BEING ABLE TO STOP OR CONTROL WORRYING: NEARLY EVERY DAY
6. BECOMING EASILY ANNOYED OR IRRITABLE: MORE THAN HALF THE DAYS
7. FEELING AFRAID AS IF SOMETHING AWFUL MIGHT HAPPEN: NEARLY EVERY DAY
4. TROUBLE RELAXING: NEARLY EVERY DAY
GAD7 TOTAL SCORE: 20
3. WORRYING TOO MUCH ABOUT DIFFERENT THINGS: NEARLY EVERY DAY
GAD7 TOTAL SCORE: 20
8. IF YOU CHECKED OFF ANY PROBLEMS, HOW DIFFICULT HAVE THESE MADE IT FOR YOU TO DO YOUR WORK, TAKE CARE OF THINGS AT HOME, OR GET ALONG WITH OTHER PEOPLE?: EXTREMELY DIFFICULT
1. FEELING NERVOUS, ANXIOUS, OR ON EDGE: NEARLY EVERY DAY

## 2024-12-19 ASSESSMENT — PATIENT HEALTH QUESTIONNAIRE - PHQ9
SUM OF ALL RESPONSES TO PHQ QUESTIONS 1-9: 6
10. IF YOU CHECKED OFF ANY PROBLEMS, HOW DIFFICULT HAVE THESE PROBLEMS MADE IT FOR YOU TO DO YOUR WORK, TAKE CARE OF THINGS AT HOME, OR GET ALONG WITH OTHER PEOPLE: SOMEWHAT DIFFICULT
SUM OF ALL RESPONSES TO PHQ QUESTIONS 1-9: 6

## 2024-12-19 NOTE — PROGRESS NOTES
[facility] Primary Care: Integrated Behavioral Health    Integrated Behavioral Health   Mental Health & Addiction Services      Progress Note - Initial [facility] Visit     Patient Name: Shanti Manuel    Date: 2024  Service Type: Individual   Visit Start Time: ***  Visit End Time:  ***   Attendees: Client   Service Modality: Video Visit:      Provider verified identity through the following two step process.  Patient provided:  Patient  and Patient address    Telemedicine Visit: The patient's condition can be safely assessed and treated via synchronous audio and visual telemedicine encounter.      Reason for Telemedicine Visit: Patient has requested telehealth visit    Originating Site (Patient Location): Patient's home    Distant Site (Provider Location): [facility]    Consent:  The patient/guardian has verbally consented to: the potential risks and benefits of telemedicine (video visit) versus in person care; bill my insurance or make self-payment for services provided; and responsibility for payment of non-covered services.     Patient would like the video invitation sent by:  My Chart    Mode of Communication:  Video Conference via [employer]well    Distant Location (Provider):  On-site    As the provider I attest to compliance with applicable laws and regulations related to telemedicine.     [facility] Visit Activities (Refresh list every visit): [facility] Only         DATA:     Interactive Complexity: No   Crisis: No     Assessments completed prior to this visit:     The following assessments were completed by patient for this visit:  PHQ9:       12/3/2024     4:06 PM 2024    12:27 PM   PHQ-9 SCORE   PHQ-9 Total Score MyChart 10 (Moderate depression) 6 (Mild depression)   PHQ-9 Total Score 10  6        Patient-reported     GAD7:       12/3/2024     4:07 PM 2024    12:34 PM   AARTI-7 SCORE   Total Score 19 (severe anxiety) 20 (severe anxiety)    Total Score 19  20        Patient-reported     CAGE-AID:       12/19/2024    12:35 PM   CAGE-AID Total Score   Total Score 0    Total Score MyChart 0 (A total score of 2 or greater is considered clinically significant)       Patient-reported     PROMIS 10-Global Health (all questions and answers displayed):       12/19/2024    12:35 PM   PROMIS 10   In general, would you say your health is: Very good   In general, would you say your quality of life is: Very good   In general, how would you rate your physical health? Good   In general, how would you rate your mental health, including your mood and your ability to think? Good   In general, how would you rate your satisfaction with your social activities and relationships? Very good   In general, please rate how well you carry out your usual social activities and roles Good   To what extent are you able to carry out your everyday physical activities such as walking, climbing stairs, carrying groceries, or moving a chair? Completely   In the past 7 days, how often have you been bothered by emotional problems such as feeling anxious, depressed, or irritable? Sometimes   In the past 7 days, how would you rate your fatigue on average? Moderate   In the past 7 days, how would you rate your pain on average, where 0 means no pain, and 10 means worst imaginable pain? 0   In general, would you say your health is: 4   In general, would you say your quality of life is: 4   In general, how would you rate your physical health? 3   In general, how would you rate your mental health, including your mood and your ability to think? 3   In general, how would you rate your satisfaction with your social activities and relationships? 4   In general, please rate how well you carry out your usual social activities and roles. (This includes activities at home, at work and in your community, and responsibilities as a parent, child, spouse, employee, friend, etc.) 3   To what extent are you  able to carry out your everyday physical activities such as walking, climbing stairs, carrying groceries, or moving a chair? 5   In the past 7 days, how often have you been bothered by emotional problems such as feeling anxious, depressed, or irritable? 3   In the past 7 days, how would you rate your fatigue on average? 3   In the past 7 days, how would you rate your pain on average, where 0 means no pain, and 10 means worst imaginable pain? 0   Global Mental Health Score 14    Global Physical Health Score 16    PROMIS TOTAL - SUBSCORES 30        Patient-reported     PROMIS 10-Global Health (only subscores and total score):       12/19/2024    12:35 PM   PROMIS-10 Scores Only   Global Mental Health Score 14    Global Physical Health Score 16    PROMIS TOTAL - SUBSCORES 30        Patient-reported     [place] Suicide Severity Rating Scale (Lifetime/Recent)       No data to display                 Referral:   Patient was referred to [facility] by primary care provider.    Reason for referral: determine behavioral health treatment options.      [facility] introduced self and role. Discussed informed consent and limits to confidentiality.     Presenting Concerns/ Current Stressors:   ***     Therapeutic Interventions:  {[facility] Interventions:867640}    Response to treatment interventions:   {Patient response to intervention:233729}    Safety Issues and Plan for Safety and Risk Management:     Patient {Risk History:067295}   Patient {PERSONAL SAFETY:563230}   Patient {SUICIDAL IDEATION:745131}   Patient {HOMICIDAL IDEATION:489818}   Patient {SELF INJURIOUS:527803}   Patient {OTHER SAFETY CONCERNS:070914}   {SAFETY PLAN:171248}   Patient reports there are {Firearms:060332}.       ASSESSMENT:   Mental Status:     Appearance:   {Appearance:436988}   Eye Contact:   {Eye Contact:438083}   Psychomotor Behavior: {Psychomotor Behavior:667218}   Attitude:   {Attitude:337417}   Orientation:   {Orientation:176047}   Speech Rate / Production: {Speech  Rate/Production:083715}   Volume:   {Speech Volume:368804}   Mood:    {Mood:464542}   Affect:    {Affect:873974}   Thought Content:  {Thought Content:869444}   Thought Form:  {Thought Form:565539}   Insight:    {Insight:518430}        Diagnostic Criteria:   {DSM5 Classifications and Criteria:009451}        DSM5 Diagnoses: (Sustained by DSM5 Criteria Listed Above)     Diagnoses: {DSM5 MH Diagnosis:263733}     Psychosocial / Contextual Factors: {Psychosocial/Contextual Factors:386898}       Collateral Reports Completed:   {[facility] COLLATERAL:885457}        PLAN: (Homework, other):     1. Patient was provided:  {Patient was provided:632867}     2. Provider recommended the following referrals: ***.        3. Suicide Risk and Safety Concerns were assessed for Shanti Manuel    Safety Plan:   {Safety Plan:350318}       {[facility]:911743}   December 19, 2024     criteria for any of the disorders of the anxiety disorders diagnostic class.        DSM5 Diagnoses: (Sustained by DSM5 Criteria Listed Above)     Diagnoses: 300.00 (F41.9) Unspecified Anxiety Disorder     Psychosocial / Contextual Factors: Occupational Issues       Collateral Reports Completed:   Not Applicable        PLAN: (Homework, other):     1. Patient was provided:  recommendation to schedule follow-up with [facility]     2. Provider recommended the following referrals: none at this time.        3. Suicide Risk and Safety Concerns were assessed for Shanti Manuel    Safety Plan:   Patient denied any current/recent/lifetime history of suicidal ideation and/or behaviors. Recommended that patient call 911 or go to the local ED should there be a change in any of these risk factors       ZHANE Cox, [facility]   December 19, 2024

## 2025-04-29 ENCOUNTER — OFFICE VISIT (OUTPATIENT)
Dept: FAMILY MEDICINE | Facility: CLINIC | Age: 35
End: 2025-04-29
Payer: COMMERCIAL

## 2025-04-29 VITALS
HEART RATE: 74 BPM | SYSTOLIC BLOOD PRESSURE: 124 MMHG | RESPIRATION RATE: 16 BRPM | TEMPERATURE: 98.2 F | WEIGHT: 192.6 LBS | OXYGEN SATURATION: 98 % | BODY MASS INDEX: 31.09 KG/M2 | DIASTOLIC BLOOD PRESSURE: 85 MMHG

## 2025-04-29 DIAGNOSIS — L30.9 ECZEMA, UNSPECIFIED TYPE: ICD-10-CM

## 2025-04-29 DIAGNOSIS — D23.71 DERMATOFIBROMA OF LOWER LEG, RIGHT: Primary | ICD-10-CM

## 2025-04-29 PROBLEM — H69.90 DYSFUNCTION OF EUSTACHIAN TUBE: Status: RESOLVED | Noted: 2025-04-29 | Resolved: 2025-04-29

## 2025-04-29 PROBLEM — O09.812 PREGNANCY RESULTING FROM ASSISTED REPRODUCTIVE TECHNOLOGY IN SECOND TRIMESTER: Status: RESOLVED | Noted: 2022-07-20 | Resolved: 2025-04-29

## 2025-04-29 PROBLEM — M41.9 SCOLIOSIS: Status: ACTIVE | Noted: 2025-04-29

## 2025-04-29 RX ORDER — TRIAMCINOLONE ACETONIDE 1 MG/G
OINTMENT TOPICAL 2 TIMES DAILY PRN
Qty: 30 G | Refills: 3 | Status: SHIPPED | OUTPATIENT
Start: 2025-04-29

## 2025-04-29 ASSESSMENT — ANXIETY QUESTIONNAIRES
IF YOU CHECKED OFF ANY PROBLEMS ON THIS QUESTIONNAIRE, HOW DIFFICULT HAVE THESE PROBLEMS MADE IT FOR YOU TO DO YOUR WORK, TAKE CARE OF THINGS AT HOME, OR GET ALONG WITH OTHER PEOPLE: NOT DIFFICULT AT ALL
3. WORRYING TOO MUCH ABOUT DIFFERENT THINGS: NOT AT ALL
1. FEELING NERVOUS, ANXIOUS, OR ON EDGE: NOT AT ALL
5. BEING SO RESTLESS THAT IT IS HARD TO SIT STILL: NOT AT ALL
7. FEELING AFRAID AS IF SOMETHING AWFUL MIGHT HAPPEN: NOT AT ALL
2. NOT BEING ABLE TO STOP OR CONTROL WORRYING: NOT AT ALL
GAD7 TOTAL SCORE: 0
6. BECOMING EASILY ANNOYED OR IRRITABLE: NOT AT ALL
7. FEELING AFRAID AS IF SOMETHING AWFUL MIGHT HAPPEN: NOT AT ALL
GAD7 TOTAL SCORE: 0
4. TROUBLE RELAXING: NOT AT ALL
GAD7 TOTAL SCORE: 0
8. IF YOU CHECKED OFF ANY PROBLEMS, HOW DIFFICULT HAVE THESE MADE IT FOR YOU TO DO YOUR WORK, TAKE CARE OF THINGS AT HOME, OR GET ALONG WITH OTHER PEOPLE?: NOT DIFFICULT AT ALL

## 2025-04-29 ASSESSMENT — PATIENT HEALTH QUESTIONNAIRE - PHQ9
SUM OF ALL RESPONSES TO PHQ QUESTIONS 1-9: 0
SUM OF ALL RESPONSES TO PHQ QUESTIONS 1-9: 0
10. IF YOU CHECKED OFF ANY PROBLEMS, HOW DIFFICULT HAVE THESE PROBLEMS MADE IT FOR YOU TO DO YOUR WORK, TAKE CARE OF THINGS AT HOME, OR GET ALONG WITH OTHER PEOPLE: NOT DIFFICULT AT ALL

## 2025-04-29 NOTE — PROGRESS NOTES
Assessment & Plan   Problem List Items Addressed This Visit    None  Visit Diagnoses       Dermatofibroma of lower leg, right    -  Primary    Relevant Medications    triamcinolone (KENALOG) 0.1 % external ointment    Other Relevant Orders    DESTRUC BENIGN/PREMAL,1ST LESION [20713] (Completed)    Eczema, unspecified type        Relevant Medications    triamcinolone (KENALOG) 0.1 % external ointment           Dermatofibroma   -Skin exam done as below  -Patient educated on the rationale for treating lesion(s) as well as skin changes that indicate need for follow up.  -Patient elects to proceed with cryotherapy to lesion(s) after discussion of risks/benefits of cryotherapy  -Liquid nitrogen treatment to the above lesion(s), patient educated in the care of these areas.   -Advised pt to return for exam if lesions are not resolved in 6 weeks.     Subjective   Glenys is a 35 year old, presenting for the following health issues:  Skin Check (Pt has a small bump on her right shin. Pt states she has had it for 6 months and she states it doesn't cause pain.)        4/29/2025     9:15 AM   Additional Questions   Roomed by Magali-AVA     History of Present Illness       Reason for visit:  Skin check- small bump on right shin  Symptom onset:  More than a month  Symptom intensity:  Mild  Symptom progression:  Staying the same  Had these symptoms before:  No   She is taking medications regularly.   Glenys Manuel, a 35-year-old female, visited for evaluation of a skin lump on her shin. She also discussed her anxiety and a rash on her hand.    Anxiety  - Reports significant improvement in anxiety symptoms  - Did not fill the previous prescription for anxiety medication  - Focused on exercise and healthier eating habits, which contributed to feeling better    Skin lump on shin  - Presence of a lump on the shin for approximately 6 months  - Lump gets darker and turns red when pressed  - No pain associated with the lump  - No growth  observed in the lump  - Lump appeared suddenly, without any preceding cut or bite  - No family history of skin cancer  - No other concerning moles or skin issues reported  - Had a mole removed from the back during high school, which was biopsied with no cancerous results    Rash on hand  - Experiences occasional rash on the hand  - Previously diagnosed as eczema  - Over-the-counter treatments were ineffective  - Has used triamcinolone prescription steroid ointment in the past, which helps manage the rash    Obstetric history  - Has two children, almost 2 1/2 years old  - Underwent IVF treatment for conception    Misc  - No family history of cancer  - Regular preventive care and screenings through [facility] specialists  Had appt in feb          12/3/2024     4:07 PM 12/19/2024    12:34 PM 4/29/2025     9:12 AM   AARTI-7 SCORE   Total Score 19 (severe anxiety) 20 (severe anxiety) 0 (minimal anxiety)   Total Score 19  20  0        Patient-reported             Objective    /85   Pulse 74   Temp 98.2  F (36.8  C) (Tympanic)   Resp 16   Wt 87.4 kg (192 lb 9.6 oz)   SpO2 98%   Breastfeeding No   BMI 31.09 kg/m    Body mass index is 31.09 kg/m .  Physical Exam   Well appearing  1 cm lesion as pictured below, unpigmented, slightly raised, nontender, without concerning findings under dermoscope                Signed Electronically by: Jess Brooks MD

## 2025-08-09 ENCOUNTER — HEALTH MAINTENANCE LETTER (OUTPATIENT)
Age: 35
End: 2025-08-09

## (undated) DEVICE — GLOVE UNDER INDICATOR PI SZ 7.0 LF 41670

## (undated) DEVICE — PLATE GROUNDING ADULT W/CORD 9165L

## (undated) DEVICE — SUCTION TIP YANKAUER W/O VENT K86

## (undated) DEVICE — PAD INSERT MED PEACH 14X6.5 62550

## (undated) DEVICE — PACK MINOR SINGLE BASIN SSK3001

## (undated) DEVICE — SU PLAIN 3-0 XLH  27" 52T

## (undated) DEVICE — DRESSING MEPILEX BORDER POST-OP 4X12

## (undated) DEVICE — SUTURE MONOCRYL+ 4-0 PS-2 27IN MCP426H

## (undated) DEVICE — ADH LIQUID MASTISOL TOPICAL VIAL 2-3ML 0523-48

## (undated) DEVICE — SOL NACL 0.9% IRRIG 1000ML BOTTLE 2F7124

## (undated) DEVICE — GOWN IMPERVIOUS BREATHABLE SMART LG 89015

## (undated) DEVICE — SURGICEL POWDER ABSORBABLE HEMOSTAT 3GM 3013SP

## (undated) DEVICE — DRSG STERI STRIP 1/2X4" R1547

## (undated) DEVICE — SOL WATER IRRIG 1000ML BOTTLE 2F7114

## (undated) DEVICE — SUCTION MANIFOLD NEPTUNE 2 SYS 1 PORT 702-025-000

## (undated) DEVICE — LINER PRINTED RED HAZARD 24X24 A4824PRRO

## (undated) DEVICE — DRESSING MEPILEX BORDER POST-OP 4X10

## (undated) DEVICE — PACK MAJOR BASIN 673

## (undated) DEVICE — SUTURE MONOCRYL+ 0 CT-1 UND 18 MCP946H

## (undated) DEVICE — CUSTOM PACK C-SECTION LHE

## (undated) DEVICE — GLOVE SURG PI ULTRA TOUCH M SZ 6-1/2 LF

## (undated) DEVICE — PREP CHLORAPREP 26ML TINTED HI-LITE ORANGE 930815

## (undated) DEVICE — SUTURE PDS 0 60IN CTX+ LOOPED PDP990G

## (undated) DEVICE — UNDERPAD 30X30 BULK 949B10

## (undated) RX ORDER — EPHEDRINE SULFATE 50 MG/ML
INJECTION, SOLUTION INTRAMUSCULAR; INTRAVENOUS; SUBCUTANEOUS
Status: DISPENSED
Start: 2022-11-17

## (undated) RX ORDER — ONDANSETRON 2 MG/ML
INJECTION INTRAMUSCULAR; INTRAVENOUS
Status: DISPENSED
Start: 2022-11-17

## (undated) RX ORDER — MORPHINE SULFATE 1 MG/ML
INJECTION, SOLUTION EPIDURAL; INTRATHECAL; INTRAVENOUS
Status: DISPENSED
Start: 2022-11-17

## (undated) RX ORDER — FENTANYL CITRATE-0.9 % NACL/PF 10 MCG/ML
PLASTIC BAG, INJECTION (ML) INTRAVENOUS
Status: DISPENSED
Start: 2022-11-17

## (undated) RX ORDER — CEFAZOLIN SODIUM 1 G/3ML
INJECTION, POWDER, FOR SOLUTION INTRAMUSCULAR; INTRAVENOUS
Status: DISPENSED
Start: 2022-11-17